# Patient Record
Sex: FEMALE | Race: OTHER | HISPANIC OR LATINO | URBAN - METROPOLITAN AREA
[De-identification: names, ages, dates, MRNs, and addresses within clinical notes are randomized per-mention and may not be internally consistent; named-entity substitution may affect disease eponyms.]

---

## 2023-08-17 ENCOUNTER — INPATIENT (INPATIENT)
Facility: HOSPITAL | Age: 83
LOS: 8 days | Discharge: HOME HEALTH | DRG: 305 | End: 2023-08-26
Attending: INTERNAL MEDICINE | Admitting: STUDENT IN AN ORGANIZED HEALTH CARE EDUCATION/TRAINING PROGRAM
Payer: MEDICARE

## 2023-08-17 PROCEDURE — 99285 EMERGENCY DEPT VISIT HI MDM: CPT

## 2023-08-18 ENCOUNTER — TRANSCRIPTION ENCOUNTER (OUTPATIENT)
Age: 83
End: 2023-08-18

## 2023-08-18 VITALS
DIASTOLIC BLOOD PRESSURE: 102 MMHG | HEART RATE: 88 BPM | RESPIRATION RATE: 18 BRPM | SYSTOLIC BLOOD PRESSURE: 231 MMHG | TEMPERATURE: 98 F | OXYGEN SATURATION: 98 %

## 2023-08-18 DIAGNOSIS — R42 DIZZINESS AND GIDDINESS: ICD-10-CM

## 2023-08-18 LAB
ALBUMIN SERPL ELPH-MCNC: 4.1 G/DL — SIGNIFICANT CHANGE UP (ref 3.5–5.2)
ALBUMIN SERPL ELPH-MCNC: 4.4 G/DL — SIGNIFICANT CHANGE UP (ref 3.5–5.2)
ALP SERPL-CCNC: 55 U/L — SIGNIFICANT CHANGE UP (ref 30–115)
ALP SERPL-CCNC: 57 U/L — SIGNIFICANT CHANGE UP (ref 30–115)
ALT FLD-CCNC: 18 U/L — SIGNIFICANT CHANGE UP (ref 0–41)
ALT FLD-CCNC: 20 U/L — SIGNIFICANT CHANGE UP (ref 0–41)
ANION GAP SERPL CALC-SCNC: 12 MMOL/L — SIGNIFICANT CHANGE UP (ref 7–14)
ANION GAP SERPL CALC-SCNC: 15 MMOL/L — HIGH (ref 7–14)
AST SERPL-CCNC: 21 U/L — SIGNIFICANT CHANGE UP (ref 0–41)
AST SERPL-CCNC: 32 U/L — SIGNIFICANT CHANGE UP (ref 0–41)
BASE EXCESS BLDV CALC-SCNC: 3.7 MMOL/L — HIGH (ref -2–3)
BASOPHILS # BLD AUTO: 0.06 K/UL — SIGNIFICANT CHANGE UP (ref 0–0.2)
BASOPHILS # BLD AUTO: 0.06 K/UL — SIGNIFICANT CHANGE UP (ref 0–0.2)
BASOPHILS NFR BLD AUTO: 0.6 % — SIGNIFICANT CHANGE UP (ref 0–1)
BASOPHILS NFR BLD AUTO: 0.7 % — SIGNIFICANT CHANGE UP (ref 0–1)
BILIRUB SERPL-MCNC: 0.4 MG/DL — SIGNIFICANT CHANGE UP (ref 0.2–1.2)
BILIRUB SERPL-MCNC: 0.5 MG/DL — SIGNIFICANT CHANGE UP (ref 0.2–1.2)
BUN SERPL-MCNC: 10 MG/DL — SIGNIFICANT CHANGE UP (ref 10–20)
BUN SERPL-MCNC: 7 MG/DL — LOW (ref 10–20)
CA-I SERPL-SCNC: 1.09 MMOL/L — LOW (ref 1.15–1.33)
CALCIUM SERPL-MCNC: 9 MG/DL — SIGNIFICANT CHANGE UP (ref 8.4–10.5)
CALCIUM SERPL-MCNC: 9.2 MG/DL — SIGNIFICANT CHANGE UP (ref 8.4–10.5)
CHLORIDE SERPL-SCNC: 103 MMOL/L — SIGNIFICANT CHANGE UP (ref 98–110)
CHLORIDE SERPL-SCNC: 105 MMOL/L — SIGNIFICANT CHANGE UP (ref 98–110)
CO2 SERPL-SCNC: 24 MMOL/L — SIGNIFICANT CHANGE UP (ref 17–32)
CO2 SERPL-SCNC: 25 MMOL/L — SIGNIFICANT CHANGE UP (ref 17–32)
CREAT SERPL-MCNC: 0.9 MG/DL — SIGNIFICANT CHANGE UP (ref 0.7–1.5)
CREAT SERPL-MCNC: 1.2 MG/DL — SIGNIFICANT CHANGE UP (ref 0.7–1.5)
EGFR: 45 ML/MIN/1.73M2 — LOW
EGFR: 64 ML/MIN/1.73M2 — SIGNIFICANT CHANGE UP
EOSINOPHIL # BLD AUTO: 0.05 K/UL — SIGNIFICANT CHANGE UP (ref 0–0.7)
EOSINOPHIL # BLD AUTO: 0.1 K/UL — SIGNIFICANT CHANGE UP (ref 0–0.7)
EOSINOPHIL NFR BLD AUTO: 0.6 % — SIGNIFICANT CHANGE UP (ref 0–8)
EOSINOPHIL NFR BLD AUTO: 1 % — SIGNIFICANT CHANGE UP (ref 0–8)
GAS PNL BLDV: 136 MMOL/L — SIGNIFICANT CHANGE UP (ref 136–145)
GAS PNL BLDV: SIGNIFICANT CHANGE UP
GAS PNL BLDV: SIGNIFICANT CHANGE UP
GLUCOSE SERPL-MCNC: 149 MG/DL — HIGH (ref 70–99)
GLUCOSE SERPL-MCNC: 154 MG/DL — HIGH (ref 70–99)
HCO3 BLDV-SCNC: 26 MMOL/L — SIGNIFICANT CHANGE UP (ref 22–29)
HCT VFR BLD CALC: 40.4 % — SIGNIFICANT CHANGE UP (ref 37–47)
HCT VFR BLD CALC: 40.4 % — SIGNIFICANT CHANGE UP (ref 37–47)
HCT VFR BLDA CALC: 39 % — SIGNIFICANT CHANGE UP (ref 39–51)
HGB BLD CALC-MCNC: 13.1 G/DL — SIGNIFICANT CHANGE UP (ref 12.6–17.4)
HGB BLD-MCNC: 13.5 G/DL — SIGNIFICANT CHANGE UP (ref 12–16)
HGB BLD-MCNC: 13.7 G/DL — SIGNIFICANT CHANGE UP (ref 12–16)
IMM GRANULOCYTES NFR BLD AUTO: 0.3 % — SIGNIFICANT CHANGE UP (ref 0.1–0.3)
IMM GRANULOCYTES NFR BLD AUTO: 0.3 % — SIGNIFICANT CHANGE UP (ref 0.1–0.3)
LACTATE BLDV-MCNC: 1.3 MMOL/L — SIGNIFICANT CHANGE UP (ref 0.5–2)
LYMPHOCYTES # BLD AUTO: 2.03 K/UL — SIGNIFICANT CHANGE UP (ref 1.2–3.4)
LYMPHOCYTES # BLD AUTO: 2.25 K/UL — SIGNIFICANT CHANGE UP (ref 1.2–3.4)
LYMPHOCYTES # BLD AUTO: 23.5 % — SIGNIFICANT CHANGE UP (ref 20.5–51.1)
LYMPHOCYTES # BLD AUTO: 23.5 % — SIGNIFICANT CHANGE UP (ref 20.5–51.1)
MAGNESIUM SERPL-MCNC: 2.1 MG/DL — SIGNIFICANT CHANGE UP (ref 1.8–2.4)
MAGNESIUM SERPL-MCNC: 2.2 MG/DL — SIGNIFICANT CHANGE UP (ref 1.8–2.4)
MCHC RBC-ENTMCNC: 31.3 PG — HIGH (ref 27–31)
MCHC RBC-ENTMCNC: 31.5 PG — HIGH (ref 27–31)
MCHC RBC-ENTMCNC: 33.4 G/DL — SIGNIFICANT CHANGE UP (ref 32–37)
MCHC RBC-ENTMCNC: 33.9 G/DL — SIGNIFICANT CHANGE UP (ref 32–37)
MCV RBC AUTO: 92.2 FL — SIGNIFICANT CHANGE UP (ref 81–99)
MCV RBC AUTO: 94.2 FL — SIGNIFICANT CHANGE UP (ref 81–99)
MONOCYTES # BLD AUTO: 0.61 K/UL — HIGH (ref 0.1–0.6)
MONOCYTES # BLD AUTO: 0.76 K/UL — HIGH (ref 0.1–0.6)
MONOCYTES NFR BLD AUTO: 7.1 % — SIGNIFICANT CHANGE UP (ref 1.7–9.3)
MONOCYTES NFR BLD AUTO: 7.9 % — SIGNIFICANT CHANGE UP (ref 1.7–9.3)
NEUTROPHILS # BLD AUTO: 5.87 K/UL — SIGNIFICANT CHANGE UP (ref 1.4–6.5)
NEUTROPHILS # BLD AUTO: 6.36 K/UL — SIGNIFICANT CHANGE UP (ref 1.4–6.5)
NEUTROPHILS NFR BLD AUTO: 66.7 % — SIGNIFICANT CHANGE UP (ref 42.2–75.2)
NEUTROPHILS NFR BLD AUTO: 67.8 % — SIGNIFICANT CHANGE UP (ref 42.2–75.2)
NRBC # BLD: 0 /100 WBCS — SIGNIFICANT CHANGE UP (ref 0–0)
NRBC # BLD: 0 /100 WBCS — SIGNIFICANT CHANGE UP (ref 0–0)
PCO2 BLDV: 32 MMHG — LOW (ref 39–42)
PH BLDV: 7.52 — HIGH (ref 7.32–7.43)
PLATELET # BLD AUTO: 309 K/UL — SIGNIFICANT CHANGE UP (ref 130–400)
PLATELET # BLD AUTO: 344 K/UL — SIGNIFICANT CHANGE UP (ref 130–400)
PMV BLD: 9.5 FL — SIGNIFICANT CHANGE UP (ref 7.4–10.4)
PMV BLD: 9.9 FL — SIGNIFICANT CHANGE UP (ref 7.4–10.4)
PO2 BLDV: 71 MMHG — SIGNIFICANT CHANGE UP
POTASSIUM BLDV-SCNC: 3.7 MMOL/L — SIGNIFICANT CHANGE UP (ref 3.5–5.1)
POTASSIUM SERPL-MCNC: 3.5 MMOL/L — SIGNIFICANT CHANGE UP (ref 3.5–5)
POTASSIUM SERPL-MCNC: 3.6 MMOL/L — SIGNIFICANT CHANGE UP (ref 3.5–5)
POTASSIUM SERPL-SCNC: 3.5 MMOL/L — SIGNIFICANT CHANGE UP (ref 3.5–5)
POTASSIUM SERPL-SCNC: 3.6 MMOL/L — SIGNIFICANT CHANGE UP (ref 3.5–5)
PROT SERPL-MCNC: 6.4 G/DL — SIGNIFICANT CHANGE UP (ref 6–8)
PROT SERPL-MCNC: 7 G/DL — SIGNIFICANT CHANGE UP (ref 6–8)
RBC # BLD: 4.29 M/UL — SIGNIFICANT CHANGE UP (ref 4.2–5.4)
RBC # BLD: 4.38 M/UL — SIGNIFICANT CHANGE UP (ref 4.2–5.4)
RBC # FLD: 12.9 % — SIGNIFICANT CHANGE UP (ref 11.5–14.5)
RBC # FLD: 13 % — SIGNIFICANT CHANGE UP (ref 11.5–14.5)
SAO2 % BLDV: 96.1 % — SIGNIFICANT CHANGE UP
SODIUM SERPL-SCNC: 139 MMOL/L — SIGNIFICANT CHANGE UP (ref 135–146)
SODIUM SERPL-SCNC: 145 MMOL/L — SIGNIFICANT CHANGE UP (ref 135–146)
TROPONIN T SERPL-MCNC: <0.01 NG/ML — SIGNIFICANT CHANGE UP
WBC # BLD: 8.65 K/UL — SIGNIFICANT CHANGE UP (ref 4.8–10.8)
WBC # BLD: 9.56 K/UL — SIGNIFICANT CHANGE UP (ref 4.8–10.8)
WBC # FLD AUTO: 8.65 K/UL — SIGNIFICANT CHANGE UP (ref 4.8–10.8)
WBC # FLD AUTO: 9.56 K/UL — SIGNIFICANT CHANGE UP (ref 4.8–10.8)

## 2023-08-18 PROCEDURE — 84443 ASSAY THYROID STIM HORMONE: CPT

## 2023-08-18 PROCEDURE — 71045 X-RAY EXAM CHEST 1 VIEW: CPT | Mod: 26

## 2023-08-18 PROCEDURE — 83835 ASSAY OF METANEPHRINES: CPT

## 2023-08-18 PROCEDURE — 93005 ELECTROCARDIOGRAM TRACING: CPT

## 2023-08-18 PROCEDURE — 93306 TTE W/DOPPLER COMPLETE: CPT

## 2023-08-18 PROCEDURE — 82962 GLUCOSE BLOOD TEST: CPT

## 2023-08-18 PROCEDURE — 70551 MRI BRAIN STEM W/O DYE: CPT

## 2023-08-18 PROCEDURE — 92610 EVALUATE SWALLOWING FUNCTION: CPT | Mod: GN

## 2023-08-18 PROCEDURE — 70496 CT ANGIOGRAPHY HEAD: CPT | Mod: 26,MA

## 2023-08-18 PROCEDURE — 76705 ECHO EXAM OF ABDOMEN: CPT

## 2023-08-18 PROCEDURE — 93306 TTE W/DOPPLER COMPLETE: CPT | Mod: 26

## 2023-08-18 PROCEDURE — 82088 ASSAY OF ALDOSTERONE: CPT

## 2023-08-18 PROCEDURE — 36415 COLL VENOUS BLD VENIPUNCTURE: CPT

## 2023-08-18 PROCEDURE — 84244 ASSAY OF RENIN: CPT

## 2023-08-18 PROCEDURE — 97162 PT EVAL MOD COMPLEX 30 MIN: CPT | Mod: GP

## 2023-08-18 PROCEDURE — 85027 COMPLETE CBC AUTOMATED: CPT

## 2023-08-18 PROCEDURE — 83735 ASSAY OF MAGNESIUM: CPT

## 2023-08-18 PROCEDURE — 76770 US EXAM ABDO BACK WALL COMP: CPT

## 2023-08-18 PROCEDURE — 80061 LIPID PANEL: CPT

## 2023-08-18 PROCEDURE — 80053 COMPREHEN METABOLIC PANEL: CPT

## 2023-08-18 PROCEDURE — 85025 COMPLETE CBC W/AUTO DIFF WBC: CPT

## 2023-08-18 PROCEDURE — 70450 CT HEAD/BRAIN W/O DYE: CPT | Mod: 26,59,MA

## 2023-08-18 PROCEDURE — 99223 1ST HOSP IP/OBS HIGH 75: CPT

## 2023-08-18 PROCEDURE — 80048 BASIC METABOLIC PNL TOTAL CA: CPT

## 2023-08-18 PROCEDURE — 83036 HEMOGLOBIN GLYCOSYLATED A1C: CPT

## 2023-08-18 PROCEDURE — 93010 ELECTROCARDIOGRAM REPORT: CPT | Mod: 76

## 2023-08-18 PROCEDURE — 84439 ASSAY OF FREE THYROXINE: CPT

## 2023-08-18 PROCEDURE — 70498 CT ANGIOGRAPHY NECK: CPT | Mod: 26,MA

## 2023-08-18 PROCEDURE — C9113: CPT

## 2023-08-18 RX ORDER — LABETALOL HCL 100 MG
10 TABLET ORAL ONCE
Refills: 0 | Status: COMPLETED | OUTPATIENT
Start: 2023-08-18 | End: 2023-08-18

## 2023-08-18 RX ORDER — HYDRALAZINE HCL 50 MG
10 TABLET ORAL ONCE
Refills: 0 | Status: COMPLETED | OUTPATIENT
Start: 2023-08-18 | End: 2023-08-18

## 2023-08-18 RX ORDER — METOCLOPRAMIDE HCL 10 MG
10 TABLET ORAL ONCE
Refills: 0 | Status: COMPLETED | OUTPATIENT
Start: 2023-08-18 | End: 2023-08-18

## 2023-08-18 RX ORDER — POLYETHYLENE GLYCOL 3350 17 G/17G
17 POWDER, FOR SOLUTION ORAL DAILY
Refills: 0 | Status: DISCONTINUED | OUTPATIENT
Start: 2023-08-18 | End: 2023-08-18

## 2023-08-18 RX ORDER — NIFEDIPINE 30 MG
1 TABLET, EXTENDED RELEASE 24 HR ORAL
Qty: 30 | Refills: 0
Start: 2023-08-18 | End: 2023-09-16

## 2023-08-18 RX ORDER — POLYETHYLENE GLYCOL 3350 17 G/17G
17 POWDER, FOR SOLUTION ORAL
Qty: 1 | Refills: 0
Start: 2023-08-18

## 2023-08-18 RX ORDER — ONDANSETRON 8 MG/1
4 TABLET, FILM COATED ORAL ONCE
Refills: 0 | Status: COMPLETED | OUTPATIENT
Start: 2023-08-18 | End: 2023-08-18

## 2023-08-18 RX ORDER — HEPARIN SODIUM 5000 [USP'U]/ML
5000 INJECTION INTRAVENOUS; SUBCUTANEOUS EVERY 12 HOURS
Refills: 0 | Status: DISCONTINUED | OUTPATIENT
Start: 2023-08-18 | End: 2023-08-21

## 2023-08-18 RX ORDER — SENNA PLUS 8.6 MG/1
2 TABLET ORAL
Refills: 0 | Status: DISCONTINUED | OUTPATIENT
Start: 2023-08-18 | End: 2023-08-18

## 2023-08-18 RX ORDER — SENNA PLUS 8.6 MG/1
2 TABLET ORAL
Qty: 120 | Refills: 0
Start: 2023-08-18 | End: 2023-09-16

## 2023-08-18 RX ORDER — NIFEDIPINE 30 MG
60 TABLET, EXTENDED RELEASE 24 HR ORAL ONCE
Refills: 0 | Status: COMPLETED | OUTPATIENT
Start: 2023-08-18 | End: 2023-08-18

## 2023-08-18 RX ORDER — SODIUM CHLORIDE 9 MG/ML
1000 INJECTION INTRAMUSCULAR; INTRAVENOUS; SUBCUTANEOUS ONCE
Refills: 0 | Status: COMPLETED | OUTPATIENT
Start: 2023-08-18 | End: 2023-08-18

## 2023-08-18 RX ORDER — SODIUM CHLORIDE 9 MG/ML
1000 INJECTION, SOLUTION INTRAVENOUS
Refills: 0 | Status: DISCONTINUED | OUTPATIENT
Start: 2023-08-18 | End: 2023-08-19

## 2023-08-18 RX ORDER — NIFEDIPINE 30 MG
60 TABLET, EXTENDED RELEASE 24 HR ORAL AT BEDTIME
Refills: 0 | Status: DISCONTINUED | OUTPATIENT
Start: 2023-08-18 | End: 2023-08-21

## 2023-08-18 RX ORDER — LANOLIN ALCOHOL/MO/W.PET/CERES
5 CREAM (GRAM) TOPICAL ONCE
Refills: 0 | Status: COMPLETED | OUTPATIENT
Start: 2023-08-18 | End: 2023-08-18

## 2023-08-18 RX ADMIN — Medication 5 MILLIGRAM(S): at 04:43

## 2023-08-18 RX ADMIN — SENNA PLUS 2 TABLET(S): 8.6 TABLET ORAL at 06:25

## 2023-08-18 RX ADMIN — Medication 10 MILLIGRAM(S): at 21:38

## 2023-08-18 RX ADMIN — Medication 10 MILLIGRAM(S): at 00:50

## 2023-08-18 RX ADMIN — Medication 10 MILLIGRAM(S): at 20:19

## 2023-08-18 RX ADMIN — SODIUM CHLORIDE 50 MILLILITER(S): 9 INJECTION, SOLUTION INTRAVENOUS at 21:39

## 2023-08-18 RX ADMIN — HEPARIN SODIUM 5000 UNIT(S): 5000 INJECTION INTRAVENOUS; SUBCUTANEOUS at 17:35

## 2023-08-18 RX ADMIN — SODIUM CHLORIDE 50 MILLILITER(S): 9 INJECTION, SOLUTION INTRAVENOUS at 06:28

## 2023-08-18 RX ADMIN — POLYETHYLENE GLYCOL 3350 17 GRAM(S): 17 POWDER, FOR SOLUTION ORAL at 06:25

## 2023-08-18 RX ADMIN — Medication 60 MILLIGRAM(S): at 21:15

## 2023-08-18 RX ADMIN — Medication 60 MILLIGRAM(S): at 04:43

## 2023-08-18 RX ADMIN — SODIUM CHLORIDE 1000 MILLILITER(S): 9 INJECTION INTRAMUSCULAR; INTRAVENOUS; SUBCUTANEOUS at 00:47

## 2023-08-18 RX ADMIN — ONDANSETRON 4 MILLIGRAM(S): 8 TABLET, FILM COATED ORAL at 00:47

## 2023-08-18 RX ADMIN — HEPARIN SODIUM 5000 UNIT(S): 5000 INJECTION INTRAVENOUS; SUBCUTANEOUS at 06:26

## 2023-08-18 NOTE — DISCHARGE NOTE PROVIDER - NSDCFUADDAPPT_GEN_ALL_CORE_FT
Please follow up with your PCP and dentist after discharge in 1-2weeks Please follow up with your PCP and dentist after discharge in 1-2 weeks.  You have an appointment with your PCP Dr. Quijano on 9/7/23 at 2:15PM at the [facility] clinic.

## 2023-08-18 NOTE — DISCHARGE NOTE PROVIDER - DISCHARGE DIET
DASH Diet/Low Sodium Diet/Soft and Bite-Sized Diet DASH Diet/Low Sodium Diet/Soft and Bite-Sized Diet/Consistent Carbohydrate Diabetic Diets

## 2023-08-18 NOTE — DISCHARGE NOTE PROVIDER - HOSPITAL COURSE
82 year old female with PMH HTN who presents with lightheadedness and decreased po intake for the past 3 weeks.  Patient states that on 7/26 she went to the dentist and she got dentures and the dental office used a paste on the dentures which made her nauseas and now every time she eats she tastes the bad taste of the dentures and cannot eat.  Sometimes she is able to quickly swallow broth but not chew foods.  She states that not being able to eat properly has led to her being lightheaded all the time.  She oftentimes feels like she will pass out but has never passed out.  Patient has hypertension which she self treats with garlic. She also feels constipated from lack of eating real food over the past few weeks.    In ED vitals: Hypertensive urgency with /102 s/p IV labetalol 10mg and Nifedipine 60mg>>BP improved to 170s/90s  Labs: unremarkable  CTH was negative. Admitted to Tele for further management and monitoring.    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  Hypertensive urgency with /102 s/p IV labetalol 10mg and Nifedipine 60mg>>BP improved to 170s/90s  Started on Nifedipine XL 60mg daily and Monitor BP closely  DASH and low sodium- soft and bite sized diet  No events on Telemetry, EKG NSR  ECHO showed normal with EF 59%  Orthostatic vitals- negative  Will need close outpatient f/u for HTN    #Decreased PO intake due to nausea due to denture paste   IV hydration and encourage oral intake  Pt advised to f/u with her own dentist for re-evaluation    #Incidental bilateral Thyroid nodules - outpt thyroid US and TSH level     82 year old female with PMH HTN who presented with lightheadedness and decreased po intake for the past 3 weeks.  Patient stated that on 7/26 she went to the dentist and she got dentures and the dental office used a paste on the dentures which made her nauseas and now every time she eats she tastes the bad taste of the dentures and cannot eat.  Sometimes she was able to quickly swallow broth but not chew foods.  She stated that not being able to eat properly has led to her being lightheaded all the time.  She oftentimes feels like she will pass out but has never passed out.  Patient has hypertension which she self treated with garlic. She also felt constipated from lack of eating real food over the past few weeks.    In ED vitals: Hypertensive urgency with /102 s/p IV labetalol 10mg and Nifedipine 60mg>>BP improved to 170s/90s  Labs: unremarkable  CTH was negative. Admitted to Tele for further management and monitoring.    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  - Hypertensive urgency with /102 s/p IV labetalol 10mg and Nifedipine 60mg>>BP improved to 170s/190s  - started on losartan 50mg PO daily on 8/19 -> BP improved to 150s/190s  - spironolactone 25mg was started on 8/21 and d/c on 8/22  - Nifedipine XL 60mg daily was increased to 90mg daily at bedtime on 8/21 -> BP improved to 130s/140s  - DASH and low sodium- soft and bite sized diet  - No events on Telemetry, EKG NSR - tele d/c on 8/22  - ECHO showed normal with EF 59%  - Orthostatic vitals- negative  - Will need close outpatient f/u for HTN    #Decreased PO intake due to nausea due to denture paste  - was on Zofran PRN for nausea  - Pt advised to f/u with her own dentist for re-evaluation  - pt received Miralax and senna daily for constipation but then had diarrhea -> Miralax and senna were then given PRN    #Incidental bilateral Thyroid nodules  - CTA of head and neck showed bilateral thyroid nodules measuring up to 1.4cm in R thyroid lobe  - TSH, fT4 lvls within normal range  - recommend outpt thyroid U/S     82 year old female with PMH HTN who presented with lightheadedness and decreased po intake for the past 3 weeks.  Patient stated that on 7/26 she went to the dentist and she got dentures and the dental office used a paste on the dentures which made her nauseas and now every time she eats she tastes the bad taste of the dentures and cannot eat.  Sometimes she was able to quickly swallow broth but not chew foods.  She stated that not being able to eat properly has led to her being lightheaded all the time.  She oftentimes feels like she will pass out but has never passed out.  Patient has hypertension which she self treated with garlic. She also felt constipated from lack of eating real food over the past few weeks.    In ED vitals: Hypertensive urgency with /102 s/p IV labetalol 10mg and Nifedipine 60mg>>BP improved to 170s/90s  Labs: unremarkable  CTH was negative. Admitted to Tele for further management and monitoring.    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  - MRI head (8/21): no acute pathology  - Hypertensive urgency with /102 s/p IV labetalol 10mg and Nifedipine 60mg>>BP improved to 170s/190s  - started on losartan 50mg PO daily on 8/19 -> BP improved to 150s/190s  - spironolactone 25mg was started on 8/21 and d/c on 8/22  - Nifedipine XL 60mg daily was increased to 90mg daily at bedtime on 8/21 -> BP improved to 130s/140s  - No events on Telemetry, EKG NSR - tele d/c on 8/22  - ECHO (8/18) normal with EF 59%  - Orthostatic vitals- negative  - DASH and low sodium- soft and bite-sized diet  - BP controlled but Cr uptrending to 1.5 (8/24) -> d/c losartan (8/24)  - Cr downtrended  - nifedipine 90mg QD at bedtime was cont.  - d/c metoprolol succinate 25mg QD (8/25)  - started HCTZ 12.5mg QD in AM (8/26)  - needs close outpatient f/u for HTN -> has [facility] appt on 9/7 w/ Dr. Quijano    #Decreased PO intake due to nausea due to denture paste  - Zofran PRN for nausea was cont.  - Pt advised to f/u with her own dentist for re-evaluation  - pt received Miralax and senna daily for constipation but then had diarrhea -> Miralax and senna given PRN  - heartburn sensation - Protonix 40mg PO BID was cont.    #Incidental bilateral Thyroid nodules  - CTA of head and neck (8/18) showed bilateral thyroid nodules measuring up to 1.4cm in R thyroid lobe  - TSH, fT4 lvls within normal range  - recommend outpt thyroid U/S    #Elevated LFTs  - ,  (8/24) - downtrended to ,  (8/25)  - RUQ U/S (8/24): diffuse hepatic steatosis, nonmobile gallbladder wall echogenic foci measuring up to 1cm, likely polyps -> f/u exam in 6 months recommended  - f/u outpt     82 year old female with PMH HTN who presented with lightheadedness and decreased po intake for the past 3 weeks.  Patient stated that on 7/26 she went to the dentist and she got dentures and the dental office used a paste on the dentures which made her nauseas and now every time she eats she tastes the bad taste of the dentures and cannot eat.  Sometimes she was able to quickly swallow broth but not chew foods.  She stated that not being able to eat properly has led to her being lightheaded all the time.  She oftentimes feels like she will pass out but has never passed out.  Patient has hypertension which she self treated with garlic. She also felt constipated from lack of eating real food over the past few weeks.    In ED vitals: Hypertensive urgency with /102 s/p IV labetalol 10mg and Nifedipine 60mg>>BP improved to 170s/90s  Labs: unremarkable  CTH was negative. Admitted to Tele for further management and monitoring.    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  - MRI head (8/21): no acute pathology  - Hypertensive urgency with /102 s/p IV labetalol 10mg and Nifedipine 60mg>>BP improved to 170s/190s  - started on losartan 50mg PO daily on 8/19 -> BP improved to 150s/190s  - spironolactone 25mg was started on 8/21 and d/c on 8/22  - Nifedipine XL 60mg daily was increased to 90mg daily at bedtime on 8/21 -> BP improved to 130s/140s  - No events on Telemetry, EKG NSR - tele d/c on 8/22  - ECHO (8/18) normal with EF 59%  - Orthostatic vitals- negative  - DASH and low sodium- soft and bite-sized diet  - BP controlled but Cr uptrending to 1.5 (8/24) -> d/c losartan (8/24)  - Cr downtrended  - nifedipine 90mg QD at bedtime was cont.  - d/c metoprolol succinate 25mg QD (8/25)  - started HCTZ 12.5mg QD in AM (8/26)  - needs close outpatient f/u for HTN -> has [facility] appt on 9/7 w/ Dr. Quijano    #Decreased PO intake due to nausea due to denture paste  - Zofran PRN for nausea was cont.  - Pt advised to f/u with her own dentist for re-evaluation  - pt received Miralax and senna daily for constipation but then had diarrhea -> Miralax and senna given PRN  - heartburn sensation - Protonix 40mg PO BID was cont.    #Incidental bilateral Thyroid nodules  - CTA of head and neck (8/18) showed bilateral thyroid nodules measuring up to 1.4cm in R thyroid lobe  - TSH, fT4 lvls within normal range  - recommend outpt thyroid U/S    #Elevated LFTs  - ,  (8/24) - downtrended to ,  (8/25)  - RUQ U/S (8/24): diffuse hepatic steatosis, nonmobile gallbladder wall echogenic foci measuring up to 1cm, likely polyps -> f/u exam in 6 months recommended  - f/u outpt    Attending Attestation:  Patient was seen & examined independently. At least 10 systems were reviewed in ROS. All systems reviewed  are within normal limits. Latest vital signs and labs were reviewed today. Case was discussed with house staff in morning rounds for assessment and plan.  Patient is medically stable for discharge . About 32 mins spent on discharge disposition.

## 2023-08-18 NOTE — DISCHARGE NOTE PROVIDER - NSDCCPCAREPLAN_GEN_ALL_CORE_FT
PRINCIPAL DISCHARGE DIAGNOSIS  Diagnosis: Lightheadedness  Assessment and Plan of Treatment: You presented with lightheadedness and found to have uncontrolled hypertension on presentation. You symptoms are likely due to uncontrolled blood pressure and poor oral inatke. Most of the work up came back negative for any brain or heart issues. We started you on a blood pressure medication. Monitor your BP closely. Please follow up with all your doctor appointment as instructed and take medication as prescribed.  You are advised to follow up with your dentist outpatient for adjustment of the dnetures and eat a soft low salt DASH diet.        SECONDARY DISCHARGE DIAGNOSES  Diagnosis: Uncontrolled hypertension  Assessment and Plan of Treatment: Hypertension  Hypertension, commonly called high blood pressure, is when the force of blood pumping through your arteries is too strong. Hypertension forces your heart to work harder to pump blood. Your arteries may become narrow or stiff. Having untreated or uncontrolled hypertension for a long period of time can cause heart attack, stroke, kidney disease, and other problems. If started on a medication, take exactly as prescribed by your health care professional. Maintain a healthy lifestyle and follow up with your primary care physician.  SEEK IMMEDIATE MEDICAL CARE IF YOU HAVE ANY OF THE FOLLOWING SYMPTOMS: severe headache, confusion, chest pain, abdominal pain, vomiting, or shortness of breath.      Diagnosis: Thyroid nodule incidentally noted on imaging study  Assessment and Plan of Treatment: You were found to have thyroid nocules on CT scan. You are advised to follow up with your PCP for thyroid ultrasound and TSH levels.       PRINCIPAL DISCHARGE DIAGNOSIS  Diagnosis: Lightheadedness  Assessment and Plan of Treatment: You presented with lightheadedness and was found to have uncontrolled hypertension on presentation. Your symptoms are likely due to uncontrolled high blood pressure and poor oral intake. Most of the work up came back negative for any brain or heart issues. We started you on blood pressure medications in the hospital that you should continue taking at home. Please monitor your blood pressure closely. Continue to take your medications as prescribed and follow up with your primary care physician. Your next appointment is with Dr. Quijano on 9/7/23 at 2:15PM at the [facility] clinic. You are advised to follow a soft and bite-sized, low salt DASH diet.  You are also advised to follow up with your dentist outpatient for adjustment of the dentures to help increase your oral intake.        SECONDARY DISCHARGE DIAGNOSES  Diagnosis: Uncontrolled hypertension  Assessment and Plan of Treatment: Hypertension  Hypertension, commonly called high blood pressure, is when the force of blood pumping through your arteries is too strong. Hypertension forces your heart to work harder to pump blood. Your arteries may become narrow or stiff. Having untreated or uncontrolled hypertension for a long period of time can cause heart attack, stroke, kidney disease, and other problems. If started on a medication, take exactly as prescribed by your health care professional. Maintain a healthy lifestyle and follow up with your primary care physician.  SEEK IMMEDIATE MEDICAL CARE IF YOU HAVE ANY OF THE FOLLOWING SYMPTOMS: severe headache, confusion, chest pain, abdominal pain, vomiting, or shortness of breath.      Diagnosis: Thyroid nodule incidentally noted on imaging study  Assessment and Plan of Treatment: You were found to have thyroid nodules on the CT scan. You are advised to follow up with your primary care physician for a thyroid ultrasound and TSH levels.       PRINCIPAL DISCHARGE DIAGNOSIS  Diagnosis: Lightheadedness  Assessment and Plan of Treatment: You presented with lightheadedness and was found to have uncontrolled hypertension on presentation. Your symptoms are likely due to uncontrolled high blood pressure and poor oral intake. Most of the work up came back negative for any brain or heart issues. We started you on blood pressure medications in the hospital that you should continue taking at home. Please monitor your blood pressure closely. Continue to take your medications as prescribed and follow up with your primary care physician. Your next appointment is with Dr. Quijano on 9/7/23 at 2:15PM at the [facility] clinic. You are advised to follow a soft and bite-sized, consistent carbohydrate, low salt DASH diet.  You are also advised to follow up with your dentist outpatient for adjustment of the dentures to help increase your oral intake.        SECONDARY DISCHARGE DIAGNOSES  Diagnosis: Uncontrolled hypertension  Assessment and Plan of Treatment: Hypertension  Hypertension, commonly called high blood pressure, is when the force of blood pumping through your arteries is too strong. Hypertension forces your heart to work harder to pump blood. Your arteries may become narrow or stiff. Having untreated or uncontrolled hypertension for a long period of time can cause heart attack, stroke, kidney disease, and other problems. If started on a medication, take exactly as prescribed by your health care professional. Maintain a healthy lifestyle and follow up with your primary care physician.  SEEK IMMEDIATE MEDICAL CARE IF YOU HAVE ANY OF THE FOLLOWING SYMPTOMS: severe headache, confusion, chest pain, abdominal pain, vomiting, or shortness of breath.      Diagnosis: Thyroid nodule incidentally noted on imaging study  Assessment and Plan of Treatment: You were found to have thyroid nodules on the CT scan. You are advised to follow up with your primary care physician for a thyroid ultrasound and TSH levels.      Diagnosis: Gallbladder polyp  Assessment and Plan of Treatment: You were found to have polyps of the gallbladder on ultrasound. A polyp is a non-cancerous growth. You are advised to follow up with your primary care physician for a follow-up examination in 6 months.

## 2023-08-18 NOTE — ED PROVIDER NOTE - CLINICAL SUMMARY MEDICAL DECISION MAKING FREE TEXT BOX
Patient presents with dizziness. labs, ekg, cxr, ct done no acute findings. BP improved in the ED. Patient admitted for further evaluation.

## 2023-08-18 NOTE — SWALLOW BEDSIDE ASSESSMENT ADULT - SLP GENERAL OBSERVATIONS
Pt found laying in bed a&ox4. Pt presents w/ no upper teeth. Pt said she recently got dentures but they made her nauseous. Pt states its hard to chew now that she has no upper teeth.

## 2023-08-18 NOTE — H&P ADULT - ATTENDING COMMENTS
Pt seen and examined today in ED4. History confirmed with the pt and as above. She was using garlic to treat her hypertension. She received dentures recently but had nausea and decreased intake due to bad taste. She felt lightheaded but did not have LOC. No headache, vomiting, abdominal pain, SOB, chest pain. She drank some liquids today. BP in the ED was 231/102 and she was given labetalol 10mg IV x1   She passed for soft diet now. ROS negative    T(F): 98.9 (08-18-23 @ 08:30), Max: 98.9 (08-18-23 @ 08:30)  HR: 86 (08-18-23 @ 08:30) (68 - 88)  BP: 178/84 (08-18-23 @ 08:30) (166/74 - 231/102)  RR: 18 (08-18-23 @ 08:30) (18 - 18)  SpO2: 96% (08-18-23 @ 08:30) (96% - 99%) on RA    POCT Blood Glucose.: 152 mg/dL (18 Aug 2023 00:22)    PHYSICAL EXAM:  GENERAL: NAD  HEAD:  Normocephalic  EYES:  conjunctiva and sclera clear  ENMT: Moist mucous membranes  NECK: Supple, No JVD  NERVOUS SYSTEM:  Alert, awake, Good concentration, motor 5/5 b/l, no nystagmus, no ataxia on FTN, EOMI  CHEST/LUNG: CTA b/l; No rales, rhonchi, wheezing  HEART: Regular rate and rhythm; No murmurs  ABDOMEN: Soft, Nontender, Nondistended; Bowel sounds present  EXTREMITIES: No edema  SKIN: warm, dry    Consultant(s) Notes Reviewed:  [x ] YES  [ ] NO  Care Discussed with Consultants/Other Providers [ x] YES  [ ] NO    Telemetry reviewed by me  EKG reviewed by me    LABS:                        13.5   8.65  )-----------( 309      ( 18 Aug 2023 11:12 )             40.4     08-18    139  |  103  |  10  ----------------------------<  149<H>  3.6   |  24  |  1.2    Ca    9.2      18 Aug 2023 00:23  Mg     2.2     08-18  TPro  7.0  /  Alb  4.4  /  TBili  0.5  /  DBili  x   /  AST  32  /  ALT  20  /  AlkPhos  55  08-18  CARDIAC MARKERS ( 18 Aug 2023 00:23 )  x     / <0.01 ng/mL / x     / x     / x        RADIOLOGY & ADDITIONAL TESTS:  Imaging or report Personally Reviewed:  [x ] YES  [ ] NO  Case discussed with residents and pt on rounds today    81 y/o woman with PMH of HTN on garlic presented with lightheadedness and decreased po intake for the past 3 weeks.      1. 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 Decreased PO intake - once tolerating diet, d/c IVF  nausea due to denture paste - pt advised to f/u with her own dentist for re-evaluation    3. Thyroid nodules - outpt thyroid US and TSH level    4. DVT prophylaxis - OOB and ambulate - on heparin SQ    full code  guarded prognosis    possible discharge to home in 24hrs if BP is better controlled, pt is tolerating the diet and after review of ECHO and orthostatics

## 2023-08-18 NOTE — DISCHARGE NOTE PROVIDER - CARE PROVIDERS DIRECT ADDRESSES
,DirectAddress_Unknown ,[email or web address]

## 2023-08-18 NOTE — DISCHARGE NOTE PROVIDER - NSDCMRMEDTOKEN_GEN_ALL_CORE_FT
hydroCHLOROthiazide 12.5 mg oral capsule: 1 cap(s) orally once a day  hydroCHLOROthiazide 12.5 mg oral capsule: 1 cap(s) orally once a day  NIFEdipine 90 mg oral tablet, extended release: 1 tab(s) orally once a day (at bedtime)  pantoprazole 40 mg oral delayed release tablet: 1 tab(s) orally once a day  Procardia XL 90 mg oral tablet, extended release: 1 tab(s) orally once a day  Protonix 40 mg oral delayed release tablet: 1 tab(s) orally once a day  Senna 8.6 mg oral tablet: 2 tab(s) orally once a day (at bedtime)

## 2023-08-18 NOTE — H&P ADULT - ASSESSMENT
Patient is an 82 year old female with PMH HTN who presents with lightheadedness and decreased po intake for the past 3 weeks.      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 Otherwise no significant stenosis of the vessels of the head and neck. Bilateral thyroid nodules measuring up to 1.4 cm in the right thyroid lobe. May consider nonemergent outpatient thyroid ultrasound for further evaluation.  -Ct head non con: No evidence for acute intracranial pathology.  -Patient states that on 7/26 she went to the dentist and she got dentures and the dental office used a paste on the dentures which made her nauseas and now every time she eats she tastes the bad taste of the dentures and cannot eat.    -she states the bad taste and lack of eating causes her to be lightheaded and has almost passed out numberous times  -dental consult for possible alterations in dentures  -obtain echo, orthostatics  -S&S  -start IV fluids    #HTN  -/102  -patient uses garlic as treatment   -start nifedipine    #Constipation  -start senna/miralax    #Bilateral thyroid nodules measuring up to 1.4 cm in the right thyroid lobe  - May consider nonemergent outpatient thyroid ultrasound for further evaluation.    DVT proph: heparin subq

## 2023-08-18 NOTE — ED PROVIDER NOTE - OBJECTIVE STATEMENT
Pt is an 81 y/o female with PMH of HTN (not on meds) presenting for generalized weakness x 3 weeks. Pt is an 81 y/o female with PMH of HTN (not on meds) presenting for generalized weakness x 3 weeks. Reports lightheadedness and nausea. Pt is an 83 y/o female with PMH of HTN (not on meds) presenting for generalized weakness x 3 weeks. Reports lightheadedness and nausea. No headache, abdominal pain or diarrhea. Was seen at [facility] a few days ago and discharged.

## 2023-08-18 NOTE — ED ADULT TRIAGE NOTE - CHIEF COMPLAINT QUOTE
PT presents to the Ed c/o of nausea, dizziness and weakness x3 weeks. PT presents to the Ed c/o of nausea, dizziness and weakness x3 weeks. PT very lethargic in triage minimally responsive. Pt falling out of wheelchair pt brought to crit.

## 2023-08-18 NOTE — ED ADULT NURSE NOTE - NSFALLUNIVINTERV_ED_ALL_ED
Bed/Stretcher in lowest position, wheels locked, appropriate side rails in place/Call bell, personal items and telephone in reach/Instruct patient to call for assistance before getting out of bed/chair/stretcher/Non-slip footwear applied when patient is off stretcher/[place] to call system/Physically safe environment - no spills, clutter or unnecessary equipment/Purposeful proactive rounding/Room/bathroom lighting operational, light cord in reach

## 2023-08-18 NOTE — SWALLOW BEDSIDE ASSESSMENT ADULT - SWALLOW EVAL: DIAGNOSIS
Pt presents w/ no overt clinical s/s of aspiration/penetration while consuming soft & bite sized & thin liquids.

## 2023-08-18 NOTE — ED ADULT NURSE NOTE - CHIEF COMPLAINT QUOTE
PT presents to the Ed c/o of nausea, dizziness and weakness x3 weeks. PT very lethargic in triage minimally responsive. Pt falling out of wheelchair pt brought to crit.

## 2023-08-18 NOTE — ED PROVIDER NOTE - PHYSICAL EXAMINATION
CONST: well appearing for age  HEAD:  normocephalic, atraumatic  EYES:  conjunctivae without injection, drainage or discharge  ENMT:  nasal mucosa moist; mouth moist without ulcerations or lesions; throat moist without erythema, exudate, ulcerations or lesions  NECK:  supple  CARDIAC:  regular rate and rhythm, normal S1 and S2, no murmurs, rubs or gallops  RESP:  respiratory rate and effort appear normal for age; lungs are clear to auscultation bilaterally; no rales or wheezes  ABDOMEN:  soft, nontender, nondistended  MUSCULOSKELETAL/NEURO: AAOx3, sensation intact throughout, 5/5 strength in all extremities, normal movement, normal tone  SKIN:  normal skin color for age and race, well-perfused; warm and dry

## 2023-08-18 NOTE — DISCHARGE NOTE PROVIDER - NSDCFUSCHEDAPPT_GEN_ALL_CORE_FT
Shweta Quijano  [facility] PreAdmits  Scheduled Appointment: 09/07/2023    [facility]  GERIATRICS [address]  Scheduled Appointment: 09/07/2023

## 2023-08-18 NOTE — DISCHARGE NOTE PROVIDER - PROVIDER TOKENS
PROVIDER:[TOKEN:[[id number]],FOLLOWUP:[2 weeks]] PROVIDER:[TOKEN:[[id number]],FOLLOWUP:[2 weeks],ESTABLISHEDPATIENT:[T]]

## 2023-08-18 NOTE — H&P ADULT - NSHPLABSRESULTS_GEN_ALL_CORE
13.7   9.56  )-----------( 344      ( 18 Aug 2023 00:23 )             40.4       08-18    139  |  103  |  10  ----------------------------<  149<H>  3.6   |  24  |  1.2    Ca    9.2      18 Aug 2023 00:23  Mg     2.2     08-18    TPro  7.0  /  Alb  4.4  /  TBili  0.5  /  DBili  x   /  AST  32  /  ALT  20  /  AlkPhos  55  08-18              Urinalysis Basic - ( 18 Aug 2023 00:23 )    Color: x / Appearance: x / SG: x / pH: x  Gluc: 149 mg/dL / Ketone: x  / Bili: x / Urobili: x   Blood: x / Protein: x / Nitrite: x   Leuk Esterase: x / RBC: x / WBC x   Sq Epi: x / Non Sq Epi: x / Bacteria: x            Lactate Trend      CARDIAC MARKERS ( 18 Aug 2023 00:23 )  x     / <0.01 ng/mL / x     / x     / x            CAPILLARY BLOOD GLUCOSE      POCT Blood Glucose.: 152 mg/dL (18 Aug 2023 00:22)

## 2023-08-18 NOTE — ED PROVIDER NOTE - ATTENDING CONTRIBUTION TO CARE
82-year-old female past medical history of hypertension presents with dizziness.  Patient states that for the last week she has been having lightheaded dizziness.  States that she went to [facility] 2 days ago and had a work-up there.  Patient unclear what tests were done.  States that she left prior to getting her CT scan done.  Since has been at home with her son.  Son states that tonight her symptoms worsened so brought her back in for evaluation.  Patient denies any chest pain shortness of breath or palpitations.  No headaches.  Positive lightheaded sensation.  No numbness tingling or weakness.  No fevers or recent illness.    CONSTITUTIONAL: Well-developed; well-nourished; in no acute distress.   SKIN: warm, dry  HEAD: Normocephalic; atraumatic.  EYES: PERRL, EOMI, no conjunctival erythema  ENT: No nasal discharge; airway clear.  NECK: Supple; non tender.  CARD: S1, S2 normal;  Regular rate and rhythm.   RESP: No wheezes, rales or rhonchi.  ABD: soft non tender, non distended, no rebound or guarding  EXT: Normal ROM.  5/5 strength in all 4 extremities   LYMPH: No acute cervical adenopathy.  NEURO: Alert, oriented, grossly unremarkable. neurovascularly intact  PSYCH: Cooperative, appropriate.

## 2023-08-18 NOTE — H&P ADULT - NSHPPHYSICALEXAM_GEN_ALL_CORE
PHYSICAL EXAM:  GENERAL: NAD, lying comfortably in bed  HEAD:  Atraumatic, Normocephalic  EYES: EOMI, PERRLA, conjunctiva and sclera clear  ENMT: No tonsillar erythema, exudates, or enlargement; Moist mucous membranes  NECK: Supple, No JVD, Normal thyroid  HEART: Regular rate and rhythm; No murmurs, rubs, or gallops  RESPIRATORY: CTA B/L, No W/R/R  ABDOMEN: Soft, Nontender, Nondistended; Bowel sounds present  NEUROLOGY: A&Ox3, nonfocal, moving all extremities  EXTREMITIES:  2+ Peripheral Pulses, No clubbing, cyanosis, or edema

## 2023-08-18 NOTE — PATIENT PROFILE ADULT - FALL HARM RISK - UNIVERSAL INTERVENTIONS
Bed in lowest position, wheels locked, appropriate side rails in place/Call bell, personal items and telephone in reach/Instruct patient to call for assistance before getting out of bed or chair/Non-slip footwear when patient is out of bed/[place] to call system/Physically safe environment - no spills, clutter or unnecessary equipment/Purposeful Proactive Rounding/Room/bathroom lighting operational, light cord in reach

## 2023-08-18 NOTE — DISCHARGE NOTE PROVIDER - CARE PROVIDER_API CALL
Carri [address], Admin - Room [address] [place] [address]  Phone: [phone]  Fax: [phone]  Follow Up Time: 2 weeks   Shweta Quijano  Geriatric Medicine  [address] [place] [address]  Phone: [phone]  Fax: [phone]  Established Patient  Follow Up Time: 2 weeks

## 2023-08-18 NOTE — H&P ADULT - HISTORY OF PRESENT ILLNESS
Patient is an 82 year old female with PMH HTN who presents with lightheadedness and decreased po intake for the past 3 weeks.  Patient states that on 7/26 she went to the dentist and she got dentures and the dental office used a paste on the dentures which made her nauseas and now every time she eats she tastes the bad taste of the dentures and cannot eat.  Sometimes she is able to quickly swallow broth but not chew foods.  She states that not being able to eat properly has led to her being lightheaded all the time.  She oftentimes feels like she will pass out but has never passed out.  Patient has hypertension which she self treats with garlic. She also feels constipated from lack of eating real food over the past few weeks.    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 Otherwise no significant stenosis of the vessels of the head and neck. Bilateral thyroid nodules measuring up to 1.4 cm in the right thyroid lobe. May consider nonemergent outpatient thyroid ultrasound for further evaluation.  Ct head non con: No evidence for acute intracranial pathology.

## 2023-08-19 LAB
ANION GAP SERPL CALC-SCNC: 14 MMOL/L — SIGNIFICANT CHANGE UP (ref 7–14)
BUN SERPL-MCNC: 6 MG/DL — LOW (ref 10–20)
CALCIUM SERPL-MCNC: 9.3 MG/DL — SIGNIFICANT CHANGE UP (ref 8.4–10.5)
CHLORIDE SERPL-SCNC: 104 MMOL/L — SIGNIFICANT CHANGE UP (ref 98–110)
CO2 SERPL-SCNC: 22 MMOL/L — SIGNIFICANT CHANGE UP (ref 17–32)
CREAT SERPL-MCNC: 0.9 MG/DL — SIGNIFICANT CHANGE UP (ref 0.7–1.5)
EGFR: 64 ML/MIN/1.73M2 — SIGNIFICANT CHANGE UP
GLUCOSE SERPL-MCNC: 123 MG/DL — HIGH (ref 70–99)
HCT VFR BLD CALC: 43.1 % — SIGNIFICANT CHANGE UP (ref 37–47)
HGB BLD-MCNC: 14.6 G/DL — SIGNIFICANT CHANGE UP (ref 12–16)
MCHC RBC-ENTMCNC: 31.2 PG — HIGH (ref 27–31)
MCHC RBC-ENTMCNC: 33.9 G/DL — SIGNIFICANT CHANGE UP (ref 32–37)
MCV RBC AUTO: 92.1 FL — SIGNIFICANT CHANGE UP (ref 81–99)
NRBC # BLD: 0 /100 WBCS — SIGNIFICANT CHANGE UP (ref 0–0)
PLATELET # BLD AUTO: 330 K/UL — SIGNIFICANT CHANGE UP (ref 130–400)
PMV BLD: 10 FL — SIGNIFICANT CHANGE UP (ref 7.4–10.4)
POTASSIUM SERPL-MCNC: 3.5 MMOL/L — SIGNIFICANT CHANGE UP (ref 3.5–5)
POTASSIUM SERPL-SCNC: 3.5 MMOL/L — SIGNIFICANT CHANGE UP (ref 3.5–5)
RBC # BLD: 4.68 M/UL — SIGNIFICANT CHANGE UP (ref 4.2–5.4)
RBC # FLD: 12.9 % — SIGNIFICANT CHANGE UP (ref 11.5–14.5)
SODIUM SERPL-SCNC: 140 MMOL/L — SIGNIFICANT CHANGE UP (ref 135–146)
T4 FREE SERPL-MCNC: 1.8 NG/DL — SIGNIFICANT CHANGE UP (ref 0.9–1.8)
TSH SERPL-MCNC: 1.4 UIU/ML — SIGNIFICANT CHANGE UP (ref 0.27–4.2)
WBC # BLD: 8.65 K/UL — SIGNIFICANT CHANGE UP (ref 4.8–10.8)
WBC # FLD AUTO: 8.65 K/UL — SIGNIFICANT CHANGE UP (ref 4.8–10.8)

## 2023-08-19 PROCEDURE — 99232 SBSQ HOSP IP/OBS MODERATE 35: CPT

## 2023-08-19 RX ORDER — LOSARTAN POTASSIUM 100 MG/1
50 TABLET, FILM COATED ORAL DAILY
Refills: 0 | Status: DISCONTINUED | OUTPATIENT
Start: 2023-08-19 | End: 2023-08-24

## 2023-08-19 RX ORDER — METOCLOPRAMIDE HCL 10 MG
10 TABLET ORAL ONCE
Refills: 0 | Status: COMPLETED | OUTPATIENT
Start: 2023-08-19 | End: 2023-08-19

## 2023-08-19 RX ORDER — PANTOPRAZOLE SODIUM 20 MG/1
40 TABLET, DELAYED RELEASE ORAL DAILY
Refills: 0 | Status: DISCONTINUED | OUTPATIENT
Start: 2023-08-19 | End: 2023-08-23

## 2023-08-19 RX ORDER — ONDANSETRON 8 MG/1
4 TABLET, FILM COATED ORAL THREE TIMES A DAY
Refills: 0 | Status: DISCONTINUED | OUTPATIENT
Start: 2023-08-19 | End: 2023-08-26

## 2023-08-19 RX ADMIN — ONDANSETRON 4 MILLIGRAM(S): 8 TABLET, FILM COATED ORAL at 12:52

## 2023-08-19 RX ADMIN — LOSARTAN POTASSIUM 50 MILLIGRAM(S): 100 TABLET, FILM COATED ORAL at 12:52

## 2023-08-19 RX ADMIN — HEPARIN SODIUM 5000 UNIT(S): 5000 INJECTION INTRAVENOUS; SUBCUTANEOUS at 05:48

## 2023-08-19 RX ADMIN — HEPARIN SODIUM 5000 UNIT(S): 5000 INJECTION INTRAVENOUS; SUBCUTANEOUS at 17:03

## 2023-08-19 RX ADMIN — Medication 60 MILLIGRAM(S): at 21:29

## 2023-08-19 RX ADMIN — Medication 10 MILLIGRAM(S): at 03:45

## 2023-08-19 RX ADMIN — ONDANSETRON 4 MILLIGRAM(S): 8 TABLET, FILM COATED ORAL at 21:31

## 2023-08-19 RX ADMIN — PANTOPRAZOLE SODIUM 40 MILLIGRAM(S): 20 TABLET, DELAYED RELEASE ORAL at 05:51

## 2023-08-19 NOTE — PROGRESS NOTE ADULT - ASSESSMENT
82 year old female with PMH HTN who presents with lightheadedness, spinning sensation, nausea and decreased po intake for the past 3 weeks.      A/P:   Dizziness and Vertigo:   Patient is reporting lightheadedness and spinning sensation for few days, symptoms got worse so she came to ED,   BP was elevated, CT head showed no acute infarct. 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   Neuro exam today: CN II-XII intact, no nystagmus, Strength 5/5 in all limbs, reflexes normal, Finger to nose normal, gait is normal.   I had a concern if Vertigo, nausea could be from central cause like acute cerebellar infarct, especially she presented with high BP, but the fact neuro exam was normal, symptoms reported for few days and Brain CT was negative, make it less likely, no vertigo today.   She has nausea and reports episode of diarrhea, may explain the nausea from gastroenteritis.   Orthostatic changes were negative Hb normal.   EKG showed Normal Sinus Rhythm. No ST or T changes, Troponin x1 negative.     HTN urgency:   not on treatment, likely will need two agent  Started on Nifedipine, add Losartan 25mg daily. Check TSH, echo.     Bilateral thyroid nodules measuring up to 1.4 cm in the right thyroid lobe  Check TSH, FT4.   - May consider nonemergent outpatient thyroid ultrasound for further evaluation.    DVT Prophylaxis: Heparin SC.   #Progress Note Handoff:  Pending (specify): improving nausea, monitor diarrhea.   Family discussion:  Disposition: Home in 24hrs.

## 2023-08-20 LAB
ANION GAP SERPL CALC-SCNC: 13 MMOL/L — SIGNIFICANT CHANGE UP (ref 7–14)
BUN SERPL-MCNC: 10 MG/DL — SIGNIFICANT CHANGE UP (ref 10–20)
CALCIUM SERPL-MCNC: 9 MG/DL — SIGNIFICANT CHANGE UP (ref 8.4–10.5)
CHLORIDE SERPL-SCNC: 102 MMOL/L — SIGNIFICANT CHANGE UP (ref 98–110)
CO2 SERPL-SCNC: 25 MMOL/L — SIGNIFICANT CHANGE UP (ref 17–32)
CREAT SERPL-MCNC: 1.1 MG/DL — SIGNIFICANT CHANGE UP (ref 0.7–1.5)
EGFR: 50 ML/MIN/1.73M2 — LOW
GLUCOSE SERPL-MCNC: 117 MG/DL — HIGH (ref 70–99)
HCT VFR BLD CALC: 42.9 % — SIGNIFICANT CHANGE UP (ref 37–47)
HGB BLD-MCNC: 14.3 G/DL — SIGNIFICANT CHANGE UP (ref 12–16)
MCHC RBC-ENTMCNC: 30.7 PG — SIGNIFICANT CHANGE UP (ref 27–31)
MCHC RBC-ENTMCNC: 33.3 G/DL — SIGNIFICANT CHANGE UP (ref 32–37)
MCV RBC AUTO: 92.1 FL — SIGNIFICANT CHANGE UP (ref 81–99)
NRBC # BLD: 0 /100 WBCS — SIGNIFICANT CHANGE UP (ref 0–0)
PLATELET # BLD AUTO: 299 K/UL — SIGNIFICANT CHANGE UP (ref 130–400)
PMV BLD: 9.7 FL — SIGNIFICANT CHANGE UP (ref 7.4–10.4)
POTASSIUM SERPL-MCNC: 3.3 MMOL/L — LOW (ref 3.5–5)
POTASSIUM SERPL-SCNC: 3.3 MMOL/L — LOW (ref 3.5–5)
RBC # BLD: 4.66 M/UL — SIGNIFICANT CHANGE UP (ref 4.2–5.4)
RBC # FLD: 12.9 % — SIGNIFICANT CHANGE UP (ref 11.5–14.5)
SODIUM SERPL-SCNC: 140 MMOL/L — SIGNIFICANT CHANGE UP (ref 135–146)
WBC # BLD: 8.66 K/UL — SIGNIFICANT CHANGE UP (ref 4.8–10.8)
WBC # FLD AUTO: 8.66 K/UL — SIGNIFICANT CHANGE UP (ref 4.8–10.8)

## 2023-08-20 PROCEDURE — 93010 ELECTROCARDIOGRAM REPORT: CPT

## 2023-08-20 PROCEDURE — 99232 SBSQ HOSP IP/OBS MODERATE 35: CPT

## 2023-08-20 RX ORDER — SPIRONOLACTONE 25 MG/1
25 TABLET, FILM COATED ORAL DAILY
Refills: 0 | Status: DISCONTINUED | OUTPATIENT
Start: 2023-08-21 | End: 2023-08-22

## 2023-08-20 RX ORDER — AMLODIPINE BESYLATE 2.5 MG/1
10 TABLET ORAL ONCE
Refills: 0 | Status: COMPLETED | OUTPATIENT
Start: 2023-08-20 | End: 2023-08-20

## 2023-08-20 RX ORDER — SPIRONOLACTONE 25 MG/1
25 TABLET, FILM COATED ORAL DAILY
Refills: 0 | Status: DISCONTINUED | OUTPATIENT
Start: 2023-08-20 | End: 2023-08-20

## 2023-08-20 RX ADMIN — HEPARIN SODIUM 5000 UNIT(S): 5000 INJECTION INTRAVENOUS; SUBCUTANEOUS at 18:02

## 2023-08-20 RX ADMIN — Medication 60 MILLIGRAM(S): at 20:40

## 2023-08-20 RX ADMIN — ONDANSETRON 4 MILLIGRAM(S): 8 TABLET, FILM COATED ORAL at 07:39

## 2023-08-20 RX ADMIN — LOSARTAN POTASSIUM 50 MILLIGRAM(S): 100 TABLET, FILM COATED ORAL at 05:26

## 2023-08-20 RX ADMIN — PANTOPRAZOLE SODIUM 40 MILLIGRAM(S): 20 TABLET, DELAYED RELEASE ORAL at 11:51

## 2023-08-20 RX ADMIN — HEPARIN SODIUM 5000 UNIT(S): 5000 INJECTION INTRAVENOUS; SUBCUTANEOUS at 05:26

## 2023-08-20 RX ADMIN — AMLODIPINE BESYLATE 10 MILLIGRAM(S): 2.5 TABLET ORAL at 01:23

## 2023-08-20 NOTE — PROGRESS NOTE ADULT - ASSESSMENT
82 year old female with PMH HTN who presents with lightheadedness, spinning sensation, nausea and decreased po intake for the past 3 weeks.      A/P:   Dizziness and Vertigo:   Patient is reporting lightheadedness and spinning sensation for few days, symptoms got worse so she came to ED,   BP was elevated, CT head showed no acute infarct. 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   Neuro exam today: CN II-XII intact, no nystagmus, Strength 5/5 in all limbs, reflexes normal, Finger to nose normal, gait is normal.   I had a concern if Vertigo, nausea could be from central cause like acute cerebellar infarct, especially she presented with high BP, but the fact neuro exam was normal, symptoms reported for few days and Brain CT was negative, make it less likely, no vertigo today.   She has nausea and reports episode of diarrhea, now resolved, may explain the nausea from gastroenteritis.   Orthostatic changes were negative Hb normal.   EKG showed Normal Sinus Rhythm. No ST or T changes, Troponin x1 negative.   Echo was normal.     HTN urgency:   not on treatment, likely will need two agent  Started on Nifedipine, add Losartan 25mg daily. Echo was normal.   Send renin activity, aldosterone level, pheo work up.     Bilateral thyroid nodules measuring up to 1.4 cm in the right thyroid lobe  TSH, FT4 are normal.   Outpatient thyroid ultrasound.    DVT Prophylaxis: Heparin SC.   #Progress Note Handoff:  Pending (specify): improving BP,   Family discussion:  Disposition: Home in 24hrs.

## 2023-08-21 ENCOUNTER — TRANSCRIPTION ENCOUNTER (OUTPATIENT)
Age: 83
End: 2023-08-21

## 2023-08-21 LAB
ANION GAP SERPL CALC-SCNC: 12 MMOL/L — SIGNIFICANT CHANGE UP (ref 7–14)
BUN SERPL-MCNC: 14 MG/DL — SIGNIFICANT CHANGE UP (ref 10–20)
CALCIUM SERPL-MCNC: 9.3 MG/DL — SIGNIFICANT CHANGE UP (ref 8.4–10.5)
CHLORIDE SERPL-SCNC: 101 MMOL/L — SIGNIFICANT CHANGE UP (ref 98–110)
CO2 SERPL-SCNC: 27 MMOL/L — SIGNIFICANT CHANGE UP (ref 17–32)
CREAT SERPL-MCNC: 1.2 MG/DL — SIGNIFICANT CHANGE UP (ref 0.7–1.5)
EGFR: 45 ML/MIN/1.73M2 — LOW
GLUCOSE SERPL-MCNC: 108 MG/DL — HIGH (ref 70–99)
HCT VFR BLD CALC: 44.8 % — SIGNIFICANT CHANGE UP (ref 37–47)
HGB BLD-MCNC: 14.8 G/DL — SIGNIFICANT CHANGE UP (ref 12–16)
MCHC RBC-ENTMCNC: 30.6 PG — SIGNIFICANT CHANGE UP (ref 27–31)
MCHC RBC-ENTMCNC: 33 G/DL — SIGNIFICANT CHANGE UP (ref 32–37)
MCV RBC AUTO: 92.6 FL — SIGNIFICANT CHANGE UP (ref 81–99)
NRBC # BLD: 0 /100 WBCS — SIGNIFICANT CHANGE UP (ref 0–0)
PLATELET # BLD AUTO: 325 K/UL — SIGNIFICANT CHANGE UP (ref 130–400)
PMV BLD: 10.1 FL — SIGNIFICANT CHANGE UP (ref 7.4–10.4)
POTASSIUM SERPL-MCNC: 3.8 MMOL/L — SIGNIFICANT CHANGE UP (ref 3.5–5)
POTASSIUM SERPL-SCNC: 3.8 MMOL/L — SIGNIFICANT CHANGE UP (ref 3.5–5)
RBC # BLD: 4.84 M/UL — SIGNIFICANT CHANGE UP (ref 4.2–5.4)
RBC # FLD: 12.8 % — SIGNIFICANT CHANGE UP (ref 11.5–14.5)
SODIUM SERPL-SCNC: 140 MMOL/L — SIGNIFICANT CHANGE UP (ref 135–146)
WBC # BLD: 8.93 K/UL — SIGNIFICANT CHANGE UP (ref 4.8–10.8)
WBC # FLD AUTO: 8.93 K/UL — SIGNIFICANT CHANGE UP (ref 4.8–10.8)

## 2023-08-21 PROCEDURE — 70551 MRI BRAIN STEM W/O DYE: CPT | Mod: 26

## 2023-08-21 PROCEDURE — 99232 SBSQ HOSP IP/OBS MODERATE 35: CPT

## 2023-08-21 RX ORDER — SENNA PLUS 8.6 MG/1
2 TABLET ORAL AT BEDTIME
Refills: 0 | Status: DISCONTINUED | OUTPATIENT
Start: 2023-08-21 | End: 2023-08-22

## 2023-08-21 RX ORDER — POLYETHYLENE GLYCOL 3350 17 G/17G
17 POWDER, FOR SOLUTION ORAL
Refills: 0 | Status: DISCONTINUED | OUTPATIENT
Start: 2023-08-21 | End: 2023-08-22

## 2023-08-21 RX ORDER — HYDRALAZINE HCL 50 MG
10 TABLET ORAL ONCE
Refills: 0 | Status: COMPLETED | OUTPATIENT
Start: 2023-08-21 | End: 2023-08-21

## 2023-08-21 RX ORDER — HEPARIN SODIUM 5000 [USP'U]/ML
5000 INJECTION INTRAVENOUS; SUBCUTANEOUS EVERY 8 HOURS
Refills: 0 | Status: DISCONTINUED | OUTPATIENT
Start: 2023-08-21 | End: 2023-08-26

## 2023-08-21 RX ORDER — NIFEDIPINE 30 MG
90 TABLET, EXTENDED RELEASE 24 HR ORAL AT BEDTIME
Refills: 0 | Status: DISCONTINUED | OUTPATIENT
Start: 2023-08-21 | End: 2023-08-26

## 2023-08-21 RX ORDER — INSULIN LISPRO 100/ML
4 VIAL (ML) SUBCUTANEOUS ONCE
Refills: 0 | Status: DISCONTINUED | OUTPATIENT
Start: 2023-08-21 | End: 2023-08-21

## 2023-08-21 RX ORDER — PANTOPRAZOLE SODIUM 20 MG/1
40 TABLET, DELAYED RELEASE ORAL ONCE
Refills: 0 | Status: COMPLETED | OUTPATIENT
Start: 2023-08-21 | End: 2023-08-21

## 2023-08-21 RX ADMIN — HEPARIN SODIUM 5000 UNIT(S): 5000 INJECTION INTRAVENOUS; SUBCUTANEOUS at 06:06

## 2023-08-21 RX ADMIN — POLYETHYLENE GLYCOL 3350 17 GRAM(S): 17 POWDER, FOR SOLUTION ORAL at 17:01

## 2023-08-21 RX ADMIN — HEPARIN SODIUM 5000 UNIT(S): 5000 INJECTION INTRAVENOUS; SUBCUTANEOUS at 15:18

## 2023-08-21 RX ADMIN — Medication 90 MILLIGRAM(S): at 21:40

## 2023-08-21 RX ADMIN — SPIRONOLACTONE 25 MILLIGRAM(S): 25 TABLET, FILM COATED ORAL at 06:07

## 2023-08-21 RX ADMIN — LOSARTAN POTASSIUM 50 MILLIGRAM(S): 100 TABLET, FILM COATED ORAL at 06:07

## 2023-08-21 RX ADMIN — HEPARIN SODIUM 5000 UNIT(S): 5000 INJECTION INTRAVENOUS; SUBCUTANEOUS at 21:40

## 2023-08-21 RX ADMIN — POLYETHYLENE GLYCOL 3350 17 GRAM(S): 17 POWDER, FOR SOLUTION ORAL at 06:06

## 2023-08-21 RX ADMIN — Medication 10 MILLIGRAM(S): at 06:07

## 2023-08-21 RX ADMIN — ONDANSETRON 4 MILLIGRAM(S): 8 TABLET, FILM COATED ORAL at 01:31

## 2023-08-21 RX ADMIN — SENNA PLUS 2 TABLET(S): 8.6 TABLET ORAL at 06:07

## 2023-08-21 RX ADMIN — ONDANSETRON 4 MILLIGRAM(S): 8 TABLET, FILM COATED ORAL at 11:11

## 2023-08-21 RX ADMIN — SENNA PLUS 2 TABLET(S): 8.6 TABLET ORAL at 21:39

## 2023-08-21 RX ADMIN — Medication 0.5 MILLIGRAM(S): at 11:49

## 2023-08-21 RX ADMIN — PANTOPRAZOLE SODIUM 40 MILLIGRAM(S): 20 TABLET, DELAYED RELEASE ORAL at 11:11

## 2023-08-21 RX ADMIN — PANTOPRAZOLE SODIUM 40 MILLIGRAM(S): 20 TABLET, DELAYED RELEASE ORAL at 06:06

## 2023-08-21 NOTE — PROGRESS NOTE ADULT - ASSESSMENT
Patient is an 82 year old female with PMH HTN who presents with lightheadedness and decreased po intake for the past 3 weeks.  Patient states that on 7/26 she went to the dentist and she got dentures and the dental office used a paste on the dentures which made her nauseas and now every time she eats she tastes the bad taste of the dentures and cannot eat.  Sometimes she is able to quickly swallow broth but not chew foods.  She states that not being able to eat properly has led to her being lightheaded all the time.  She oftentimes feels like she will pass out but has never passed out.  Patient has hypertension which she self treats with garlic. She also feels constipated from lack of eating real food over the past few weeks.    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 Otherwise no significant stenosis of the vessels of the head and neck. Bilateral thyroid nodules measuring up to 1.4 cm in the right thyroid lobe. May consider nonemergent outpatient thyroid ultrasound for further evaluation.  Ct head non con: No evidence for acute intracranial pathology.      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 Otherwise no significant stenosis of the vessels of the head and neck. Bilateral thyroid nodules measuring up to 1.4 cm in the right thyroid lobe. May consider nonemergent outpatient thyroid ultrasound for further evaluation.  -Ct head non con: No evidence for acute intracranial pathology.  -Patient states that on 7/26 she went to the dentist and she got dentures and the dental office used a paste on the dentures which made her nauseas and now every time she eats she tastes the bad taste of the dentures and cannot eat.    -she states the bad taste and lack of eating causes her to be lightheaded and has almost passed out numberous times  -dental consult for possible alterations in dentures  -obtain echo, orthostatics  -S&S: thin liquids soft and bite-sized   -started IV fluids  -Patient still symptomatic with nausea and dizziness. Ordered head MRI    #HTN  -/102 on admission  -patient uses garlic as treatment   -started nifedipine 60 mg, Spironolactone 25 mg, and Losartan 50 mg  -BP now 150-180/70-80    #Constipation  -Started senna/miralax    #Bilateral thyroid nodules measuring up to 1.4 cm in the right thyroid lobe  -May consider nonemergent outpatient thyroid ultrasound for further evaluation.    #DVT ppx: Heparin  #GI ppx: Pantoprazole 40 mg  #Diet: DASH/TLC/Soft  #Code: Full  #Dispo: Home    Pending:  Head MRI

## 2023-08-21 NOTE — DISCHARGE NOTE NURSING/CASE MANAGEMENT/SOCIAL WORK - NSDCPEFALRISK_GEN_ALL_CORE
For information on Fall & Injury Prevention, visit: [email or web address] OR  [email or web address] OR  https://www.cdc.gov/steadi/patient.html

## 2023-08-21 NOTE — DISCHARGE NOTE NURSING/CASE MANAGEMENT/SOCIAL WORK - PATIENT PORTAL LINK FT
You can access the FollowMyHealth Patient Portal offered by [facility] by registering at the following website: [email or web address]. By joining [employer]’s FollowMyHealth portal, you will also be able to view your health information using other applications (apps) compatible with our system.

## 2023-08-21 NOTE — PROGRESS NOTE ADULT - ASSESSMENT
81 y/o woman with PMH of HTN on garlic presented with lightheadedness and decreased po intake for the past 3 weeks.      1. Near syncope (lightheaded sensation per pt)  initially attributed to hypertensive urgency and decreased PO intake  CT head: no acute intracranial pathology  CTA of head and neck: mild to mod stenosis of left PCA  pt agreeable for medical therapy for HTN- continue Nifedipine XL 60mg daily and losartan 50mg daily - BP better controlled  low sodium soft and bite sized diet  telemetry no events  ECHO: EF 59%  negative orthostatics  check MRI of brain since pt is still symptomatic and she had markedly elevated BP on presentation    2. Decreased PO intake with nausea  on zofran prn  pt stopped using denture past and will f/u with her own dentist for re-evaluation  pt now c/o constipation - miralax and senna ordered and monitor    3. Thyroid nodules - outpt thyroid US   TSH and fT4 WNL    4. DVT prophylaxis - OOB and ambulate - on heparin SQ    full code  guarded prognosis      PROGRESS NOTE HANDOFF    Pending: improved PO intake, MRI of brain  pt informed of the plan of care  Disposition: home with care possibly in 24hrs if BP remains acceptable, nausea and lightheadedness improves and after review of MRI of brain

## 2023-08-22 LAB
A1C WITH ESTIMATED AVERAGE GLUCOSE RESULT: 5.8 % — HIGH (ref 4–5.6)
ALDOST SERPL-MCNC: <3 NG/DL — SIGNIFICANT CHANGE UP
CHOLEST SERPL-MCNC: 177 MG/DL — SIGNIFICANT CHANGE UP
ESTIMATED AVERAGE GLUCOSE: 120 MG/DL — HIGH (ref 68–114)
HDLC SERPL-MCNC: 47 MG/DL — LOW
LIPID PNL WITH DIRECT LDL SERPL: 107 MG/DL — HIGH
NON HDL CHOLESTEROL: 130 MG/DL — HIGH
TRIGL SERPL-MCNC: 113 MG/DL — SIGNIFICANT CHANGE UP

## 2023-08-22 PROCEDURE — 99232 SBSQ HOSP IP/OBS MODERATE 35: CPT

## 2023-08-22 RX ORDER — POLYETHYLENE GLYCOL 3350 17 G/17G
17 POWDER, FOR SOLUTION ORAL
Refills: 0 | Status: DISCONTINUED | OUTPATIENT
Start: 2023-08-22 | End: 2023-08-26

## 2023-08-22 RX ORDER — SENNA PLUS 8.6 MG/1
2 TABLET ORAL AT BEDTIME
Refills: 0 | Status: DISCONTINUED | OUTPATIENT
Start: 2023-08-22 | End: 2023-08-26

## 2023-08-22 RX ADMIN — Medication 30 MILLILITER(S): at 05:43

## 2023-08-22 RX ADMIN — ONDANSETRON 4 MILLIGRAM(S): 8 TABLET, FILM COATED ORAL at 02:33

## 2023-08-22 RX ADMIN — POLYETHYLENE GLYCOL 3350 17 GRAM(S): 17 POWDER, FOR SOLUTION ORAL at 05:08

## 2023-08-22 RX ADMIN — SPIRONOLACTONE 25 MILLIGRAM(S): 25 TABLET, FILM COATED ORAL at 05:08

## 2023-08-22 RX ADMIN — ONDANSETRON 4 MILLIGRAM(S): 8 TABLET, FILM COATED ORAL at 15:49

## 2023-08-22 RX ADMIN — HEPARIN SODIUM 5000 UNIT(S): 5000 INJECTION INTRAVENOUS; SUBCUTANEOUS at 21:45

## 2023-08-22 RX ADMIN — Medication 90 MILLIGRAM(S): at 21:44

## 2023-08-22 RX ADMIN — LOSARTAN POTASSIUM 50 MILLIGRAM(S): 100 TABLET, FILM COATED ORAL at 05:08

## 2023-08-22 RX ADMIN — HEPARIN SODIUM 5000 UNIT(S): 5000 INJECTION INTRAVENOUS; SUBCUTANEOUS at 05:08

## 2023-08-22 RX ADMIN — HEPARIN SODIUM 5000 UNIT(S): 5000 INJECTION INTRAVENOUS; SUBCUTANEOUS at 13:15

## 2023-08-22 RX ADMIN — PANTOPRAZOLE SODIUM 40 MILLIGRAM(S): 20 TABLET, DELAYED RELEASE ORAL at 12:40

## 2023-08-22 NOTE — PROGRESS NOTE ADULT - ASSESSMENT
Patient is an 82 year old female with PMH HTN who presents with lightheadedness and decreased po intake for the past 3 weeks.  Patient states that on 7/26 she went to the dentist and she got dentures and the dental office used a paste on the dentures which made her nauseas and now every time she eats she tastes the bad taste of the dentures and cannot eat.  Sometimes she is able to quickly swallow broth but not chew foods.  She states that not being able to eat properly has led to her being lightheaded all the time.  She oftentimes feels like she will pass out but has never passed out.  Patient has hypertension which she self treats with garlic. She also feels constipated from lack of eating real food over the past few weeks.    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 Otherwise no significant stenosis of the vessels of the head and neck. Bilateral thyroid nodules measuring up to 1.4 cm in the right thyroid lobe. May consider nonemergent outpatient thyroid ultrasound for further evaluation.  CT head non con: No evidence for acute intracranial pathology.    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 Otherwise no significant stenosis of the vessels of the head and neck. Bilateral thyroid nodules measuring up to 1.4 cm in the right thyroid lobe. May consider nonemergent outpatient thyroid ultrasound for further evaluation.  -Ct head non con: No evidence for acute intracranial pathology.  -Patient states that on 7/26 she went to the dentist and she got dentures and the dental office used a paste on the dentures which made her nauseas and now every time she eats she tastes the bad taste of the dentures and cannot eat.    -she states the bad taste and lack of eating causes her to be lightheaded and has almost passed out numberous times  -dental consult for possible alterations in dentures  -Orthostatics negative  -Echo (8/18): EF 59%  -S&S: thin liquids soft and bite-sized   -d/c IV fluids 8/19  -Patient still symptomatic with nausea and dizziness. Head MRI negative for acute ischemic change. Cerebral and corpus callosal atrophy.  -d/c tely    #HTN  -/102 on admission  -patient uses garlic as treatment   -started nifedipine 60 mg>> increased to nifedipine 90 mg 8/21  -d/c Spironolactone 25 mg  -c/w Losartan 50 mg  -BP now 150-180/70-80  -plasma metanephrines, renin, aldosterone ordered and pending     #Decreased PO intake with nausea  -on zofran PRN  -pt stopped using denture-- will f/u with her own dentist for re-evaluation OP    #Constipation, resolved  -senna/miralax changed to PRN, as patient now complains of diarrhea    #Bilateral thyroid nodules measuring up to 1.4 cm in the right thyroid lobe  -TSH and fT4 WNL  -May consider nonemergent outpatient thyroid ultrasound for further evaluation.    #DVT ppx: Heparin  #GI ppx: Pantoprazole 40 mg  #Diet: DASH/TLC/Soft  #Code: Full  #Dispo: Home    Pending: repeat BP in afternoon, anticipate d/c tomorrow if BP controlled   Patient is an 82 year old female with PMH HTN who presents with lightheadedness and decreased po intake for the past 3 weeks.  Patient states that on 7/26 she went to the dentist and she got dentures and the dental office used a paste on the dentures which made her nauseas and now every time she eats she tastes the bad taste of the dentures and cannot eat.  Sometimes she is able to quickly swallow broth but not chew foods.  She states that not being able to eat properly has led to her being lightheaded all the time.  She oftentimes feels like she will pass out but has never passed out.  Patient has hypertension which she self treats with garlic. She also feels constipated from lack of eating real food over the past few weeks.    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 Otherwise no significant stenosis of the vessels of the head and neck. Bilateral thyroid nodules measuring up to 1.4 cm in the right thyroid lobe. May consider nonemergent outpatient thyroid ultrasound for further evaluation.  CT head non con: No evidence for acute intracranial pathology.    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 Otherwise no significant stenosis of the vessels of the head and neck. Bilateral thyroid nodules measuring up to 1.4 cm in the right thyroid lobe. May consider nonemergent outpatient thyroid ultrasound for further evaluation.  -Ct head non con: No evidence for acute intracranial pathology.  -Patient states that on 7/26 she went to the dentist and she got dentures and the dental office used a paste on the dentures which made her nauseas and now every time she eats she tastes the bad taste of the dentures and cannot eat.    -she states the bad taste and lack of eating causes her to be lightheaded and has almost passed out numberous times  -dental consult for possible alterations in dentures  -Orthostatics negative  -Echo (8/18): EF 59%  -S&S: thin liquids soft and bite-sized   -d/c IV fluids 8/19  -Patient still symptomatic with nausea and dizziness. Head MRI negative for acute ischemic change. Cerebral and corpus callosal atrophy.  -d/c tely    #HTN  -/102 on admission  -patient uses garlic as treatment   -started nifedipine 60 mg>> increased to nifedipine 90 mg 8/21  -d/c Spironolactone 25 mg  -c/w Losartan 50 mg  -BP now 150-180/70-80  -plasma metanephrines, renin, aldosterone ordered and pending     #Decreased PO intake with nausea  -on zofran PRN  -pt stopped using denture-- will f/u with her own dentist for re-evaluation OP    #Constipation, resolved  -senna/miralax changed to PRN, as patient now complains of diarrhea    #Bilateral thyroid nodules measuring up to 1.4 cm in the right thyroid lobe  -TSH and fT4 WNL  -May consider nonemergent outpatient thyroid ultrasound for further evaluation.    #DVT ppx: Heparin  #GI ppx: Pantoprazole 40 mg  #Diet: DASH/TLC/Soft  #Code: Full  #Dispo: Home    Pending: repeat BP in afternoon, anticipate d/c tomorrow if BP controlled    	                                                                             PLEASE NOTE: Patient to follow up at [facility] clinic Dr. Quijano 9/7/23 2:15 PM       Patient is an 82 year old female with PMH HTN who presents with lightheadedness and decreased po intake for the past 3 weeks.  Patient states that on 7/26 she went to the dentist and she got dentures and the dental office used a paste on the dentures which made her nauseas and now every time she eats she tastes the bad taste of the dentures and cannot eat.  Sometimes she is able to quickly swallow broth but not chew foods.  She states that not being able to eat properly has led to her being lightheaded all the time.  She oftentimes feels like she will pass out but has never passed out.  Patient has hypertension which she self treats with garlic. She also feels constipated from lack of eating real food over the past few weeks.    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 Otherwise no significant stenosis of the vessels of the head and neck. Bilateral thyroid nodules measuring up to 1.4 cm in the right thyroid lobe. May consider nonemergent outpatient thyroid ultrasound for further evaluation.  CT head non con: No evidence for acute intracranial pathology.    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 Otherwise no significant stenosis of the vessels of the head and neck. Bilateral thyroid nodules measuring up to 1.4 cm in the right thyroid lobe. May consider nonemergent outpatient thyroid ultrasound for further evaluation.  -Ct head non con: No evidence for acute intracranial pathology.  -Patient states that on 7/26 she went to the dentist and she got dentures and the dental office used a paste on the dentures which made her nauseas and now every time she eats she tastes the bad taste of the dentures and cannot eat.    -she states the bad taste and lack of eating causes her to be lightheaded and has almost passed out numberous times  -dental consult for possible alterations in dentures  -Orthostatics negative  -Echo (8/18): EF 59%  -S&S: thin liquids soft and bite-sized   -d/c IV fluids 8/19  -Patient still symptomatic with nausea and dizziness. Head MRI negative for acute ischemic change. Cerebral and corpus callosal atrophy.  -d/c tely    #HTN  -/102 on admission  -patient uses garlic as treatment   -started nifedipine 60 mg>> increased to nifedipine 90 mg 8/21  -d/c Spironolactone 25 mg  -c/w Losartan 50 mg  -BP now 150-180/70-80  -plasma metanephrines, renin, aldosterone ordered and pending     #Decreased PO intake with nausea  -on zofran PRN  -pt stopped using denture-- will f/u with her own dentist for re-evaluation OP    #Constipation, resolved  -senna/miralax changed to PRN, as patient now complains of diarrhea    #Bilateral thyroid nodules measuring up to 1.4 cm in the right thyroid lobe  -TSH and fT4 WNL  -May consider nonemergent outpatient thyroid ultrasound for further evaluation.    #DVT ppx: Heparin  #GI ppx: Pantoprazole 40 mg  #Diet: DASH/TLC/Soft  #Code: Full  #Dispo: Home    Pending: repeat BP in afternoon, anticipate d/c tomorrow if BP controlled    PLEASE NOTE: Patient to follow up at [facility] clinic Dr. Quijano 9/7/23 2:15 PM

## 2023-08-22 NOTE — PROGRESS NOTE ADULT - ASSESSMENT
81 y/o woman with PMH of HTN on garlic presented with lightheadedness and decreased po intake for the past 3 weeks.      1. 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 Decreased PO intake with nausea  on zofran prn  pt stopped using denture paste and will f/u with her own dentist for re-evaluation  pt c/o constipation - miralax and senna ordered and she had BM -> now with diarrhea - laxatives now prn and monitor    3. Thyroid nodules - outpt thyroid US   TSH and fT4 WNL    4. DVT prophylaxis - OOB and ambulate - on heparin SQ    full code  guarded prognosis but improving      PROGRESS NOTE HANDOFF    Pending: monitor BP today (nifedipine was increased last night)  pt informed of the plan of care  Disposition: home with care in 24hrs if remains stable  [facility] appt will be given   81 y/o woman with PMH of HTN on garlic presented with lightheadedness and decreased po intake for the past 3 weeks.      1. 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 Decreased PO intake with nausea  on zofran prn  pt stopped using denture paste and will f/u with her own dentist for re-evaluation  pt c/o constipation - miralax and senna ordered and she had BM -> now with diarrhea - laxatives now prn and monitor    3. Thyroid nodules - outpt thyroid US   TSH and fT4 WNL    4. DVT prophylaxis - OOB and ambulate - on heparin SQ    full code  guarded prognosis but improving      PROGRESS NOTE HANDOFF    Pending: monitor BP today (nifedipine was increased last night)  pt informed of the plan of care  Disposition: home with care in 24hrs if remains stable    [facility] appt: Sept 7 at 2:15PM with Dr. Quijano

## 2023-08-23 LAB
ANION GAP SERPL CALC-SCNC: 14 MMOL/L — SIGNIFICANT CHANGE UP (ref 7–14)
BUN SERPL-MCNC: 17 MG/DL — SIGNIFICANT CHANGE UP (ref 10–20)
CALCIUM SERPL-MCNC: 8.8 MG/DL — SIGNIFICANT CHANGE UP (ref 8.4–10.5)
CHLORIDE SERPL-SCNC: 102 MMOL/L — SIGNIFICANT CHANGE UP (ref 98–110)
CO2 SERPL-SCNC: 26 MMOL/L — SIGNIFICANT CHANGE UP (ref 17–32)
CREAT SERPL-MCNC: 1.4 MG/DL — SIGNIFICANT CHANGE UP (ref 0.7–1.5)
EGFR: 38 ML/MIN/1.73M2 — LOW
GLUCOSE SERPL-MCNC: 109 MG/DL — HIGH (ref 70–99)
MAGNESIUM SERPL-MCNC: 2.4 MG/DL — SIGNIFICANT CHANGE UP (ref 1.8–2.4)
POTASSIUM SERPL-MCNC: 3.8 MMOL/L — SIGNIFICANT CHANGE UP (ref 3.5–5)
POTASSIUM SERPL-SCNC: 3.8 MMOL/L — SIGNIFICANT CHANGE UP (ref 3.5–5)
SODIUM SERPL-SCNC: 142 MMOL/L — SIGNIFICANT CHANGE UP (ref 135–146)

## 2023-08-23 PROCEDURE — 99232 SBSQ HOSP IP/OBS MODERATE 35: CPT

## 2023-08-23 RX ORDER — PANTOPRAZOLE SODIUM 20 MG/1
40 TABLET, DELAYED RELEASE ORAL
Refills: 0 | Status: DISCONTINUED | OUTPATIENT
Start: 2023-08-23 | End: 2023-08-26

## 2023-08-23 RX ADMIN — ONDANSETRON 4 MILLIGRAM(S): 8 TABLET, FILM COATED ORAL at 02:24

## 2023-08-23 RX ADMIN — PANTOPRAZOLE SODIUM 40 MILLIGRAM(S): 20 TABLET, DELAYED RELEASE ORAL at 11:07

## 2023-08-23 RX ADMIN — HEPARIN SODIUM 5000 UNIT(S): 5000 INJECTION INTRAVENOUS; SUBCUTANEOUS at 05:10

## 2023-08-23 RX ADMIN — Medication 90 MILLIGRAM(S): at 21:24

## 2023-08-23 RX ADMIN — HEPARIN SODIUM 5000 UNIT(S): 5000 INJECTION INTRAVENOUS; SUBCUTANEOUS at 21:25

## 2023-08-23 RX ADMIN — PANTOPRAZOLE SODIUM 40 MILLIGRAM(S): 20 TABLET, DELAYED RELEASE ORAL at 17:02

## 2023-08-23 RX ADMIN — HEPARIN SODIUM 5000 UNIT(S): 5000 INJECTION INTRAVENOUS; SUBCUTANEOUS at 13:29

## 2023-08-23 RX ADMIN — ONDANSETRON 4 MILLIGRAM(S): 8 TABLET, FILM COATED ORAL at 16:17

## 2023-08-23 RX ADMIN — LOSARTAN POTASSIUM 50 MILLIGRAM(S): 100 TABLET, FILM COATED ORAL at 05:09

## 2023-08-23 RX ADMIN — Medication 30 MILLILITER(S): at 00:06

## 2023-08-23 NOTE — PROGRESS NOTE ADULT - ASSESSMENT
81 y/o woman with PMH of HTN on garlic presented with lightheadedness and decreased po intake for the past 3 weeks.      1. Near syncope (lightheaded sensation per pt)  attributed to hypertensive urgency and decreased PO intake  CT head: no acute intracranial pathology  CTA of head and neck: mild to mod stenosis of left PCA  MRI of brain: no acute ischemic change  pt agreeable for medical therapy for HTN- on Nifedipine XL 90mg daily and losartan 50mg daily   check BMP in AM (Cr 1.4)  low sodium soft and bite sized diet  telemetry had no events   ECHO: EF 59%  negative orthostatics    2. Decreased PO intake with nausea  on zofran prn  pt stopped using denture paste and will f/u with her own dentist for re-evaluation  heartburn sensation - change PPI to q12hr and monitor    3. Thyroid nodules - outpt thyroid US   TSH and fT4 WNL    4. DVT prophylaxis - OOB and ambulate - on heparin SQ      full code        PROGRESS NOTE HANDOFF    Pending: monitor BP today, BMP in AM, improvement in heartburn and nausea  pt informed of the plan of care  Disposition: home with care in 24hrs if improves    [facility] appt: Sept 7 at 2:15PM with Dr. Quijano

## 2023-08-23 NOTE — PHYSICAL THERAPY INITIAL EVALUATION ADULT - PERTINENT HX OF CURRENT PROBLEM, REHAB EVAL
Patient is an 82 year old female with PMH HTN who presents with lightheadedness and decreased po intake for the past 3 weeks.  Patient states that on 7/26 she went to the dentist and she got dentures and the dental office used a paste on the dentures which made her nauseas and now every time she eats she tastes the bad taste of the dentures and cannot eat.

## 2023-08-23 NOTE — MEDICAL STUDENT PROGRESS NOTE(EDUCATION) - ASSESSMENT
----------------------------------------------------  # DVT prophylaxis   # GI prophylaxis   # Diet   # Activity Score (AM-PAC)  # Code status   # Disposition  81 y/o woman with PMH of HTN on garlic presented with lightheadedness and decreased po intake for the past 3 weeks. In the ED, she was found to have hypertensive urgency: 231/102.    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  - MRI head (8/21): no acute pathology  - Hypertensive urgency with /102 s/p IV labetalol 10mg and Nifedipine 60mg>>BP improved to 170s/190s  - started on losartan 50mg PO daily on 8/19 -> BP improved to 150s/190s  - spironolactone 25mg was started on 8/21 and d/c on 8/22  - Nifedipine XL 60mg daily was increased to 90mg daily at bedtime on 8/21 -> BP improved to 130s/140s  - cont. DASH and low sodium- soft and bite-sized diet  - No events on Telemetry, EKG NSR - tele d/c on 8/22  - ECHO (8/18) normal with EF 59%  - Orthostatic vitals- negative  - Will need close outpatient f/u for HTN  - BMP in AM tmrw - Cr uptrending 1.4 today (8/23)    #Decreased PO intake due to nausea due to denture paste  - cont. Zofran PRN for nausea  - Pt advised to f/u with her own dentist for re-evaluation  - pt received Miralax and senna daily for constipation but then had diarrhea -> Miralax and senna now given PRN  - heartburn sensation - Protonix 40mg PO increased from QD to BID    #Incidental bilateral Thyroid nodules  - CTA of head and neck (8/18) showed bilateral thyroid nodules measuring up to 1.4cm in R thyroid lobe  - TSH, fT4 lvls within normal range  - recommend outpt thyroid U/S                                                                                  ----------------------------------------------------  # DVT prophylaxis: heparin 5000u SQ q8h  # GI prophylaxis: Protonix 40mg increased from QD to BID  # Diet: soft and bite-sized - DASH/TLC  # Activity Score (AM-PAC): no restrictions - home PT vs. outpt PT  # Code status: full  # Disposition: home 81 y/o woman with PMH of HTN on garlic presented with lightheadedness and decreased po intake for the past 3 weeks. In the ED, she was found to have hypertensive urgency: 231/102.    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  - MRI head (8/21): no acute pathology  - Hypertensive urgency with /102 s/p IV labetalol 10mg and Nifedipine 60mg>>BP improved to 170s/190s  - started on losartan 50mg PO daily on 8/19 -> BP improved to 150s/190s  - spironolactone 25mg was started on 8/21 and d/c on 8/22  - Nifedipine XL 60mg daily was increased to 90mg daily at bedtime on 8/21 -> BP improved to 130s/140s  - cont. DASH and low sodium- soft and bite-sized diet  - No events on Telemetry, EKG NSR - tele d/c on 8/22  - ECHO (8/18) normal with EF 59%  - Orthostatic vitals- negative  - Will need close outpatient f/u for HTN  - BMP in AM tmrw - Cr uptrending 1.4 today (8/23)    #Decreased PO intake due to nausea due to denture paste  - cont. Zofran PRN for nausea  - Pt advised to f/u with her own dentist for re-evaluation  - pt received Miralax and senna daily for constipation but then had diarrhea -> Miralax and senna now given PRN  - heartburn sensation - Protonix 40mg PO increased from QD to BID    #Incidental bilateral Thyroid nodules  - CTA of head and neck (8/18) showed bilateral thyroid nodules measuring up to 1.4cm in R thyroid lobe  - TSH, fT4 lvls within normal range  - recommend outpt thyroid U/S                                                                # DVT prophylaxis: heparin 5000u SQ q8h  # GI prophylaxis: Protonix 40mg increased from QD to BID  # Diet: soft and bite-sized - DASH/TLC  # Activity Score (AM-PAC): no restrictions - home PT vs. outpt PT  # Code status: full  # Disposition: home

## 2023-08-23 NOTE — MEDICAL STUDENT PROGRESS NOTE(EDUCATION) - SUBJECTIVE AND OBJECTIVE BOX
MIKEL PEDROZA 82y Female  MRN#: [id number]   CODE STATUS: FULL    Hospital Day: 5d    Pt is currently admitted with the primary diagnosis of dizziness and giddiness    SUBJECTIVE    There were no overnight events. Pt was seen and examined at bedside today. Pt states that she still has lightheadedness, nausea, and an "aching" pain in her epigastric region; however, it is much improved since admission. She also staed that she has been feeling weakness in her legs from not walking around since admission; PT was consulted: she is able to ambulate without a need for assistive devices. They recommended home PT vs. outpt PT. Later on in the morning, after she was seen by PT, pt was on the phone, tearful, stating that she felt a tight sensation in her left leg. She denies having any pain, only tension. On physical examination, there was no redness, but there was slight edema (+1) in both lower extremities.     The pt explained that she no longer felt safe being home by herself after everything that happened to her in the hospital. She wants to live with her son, but he is moving to a "[employer] 8" place that won't allow her to live with him. She plans to stay with her son for a short while after she is discharged from the hospital but wants to know if anything can be done to help her because she does not want to live alone anymore. When the pt was asked if she would be able to go to the doctor's office, buy groceries, etc., by herself when she gets discharged from the hospital, she stated that she could but she is too scared to. Case Management and Social Work referrals were placed.    Present Today:   - Anai:  No [ X ], Yes [   ] : Indication:     - Type of IV Access:       .. CVC/Piccline:  No [ X ], Yes [   ] : Indication:       .. Midline: No [ X ], Yes [   ] : Indication:                                             ----------------------------------------------------------  OBJECTIVE  PAST MEDICAL & SURGICAL HISTORY                                            -----------------------------------------------------------  ALLERGIES:  Allergy Status Unknown                                            ------------------------------------------------------------    HOME MEDICATIONS  Home Medications:                           MEDICATIONS:  STANDING MEDICATIONS  heparin   Injectable 5000 Unit(s) SubCutaneous every 8 hours  losartan 50 milliGRAM(s) Oral daily  NIFEdipine XL 90 milliGRAM(s) Oral at bedtime  pantoprazole    Tablet 40 milliGRAM(s) Oral two times a day    PRN MEDICATIONS  aluminum hydroxide/magnesium hydroxide/simethicone Suspension 30 milliLiter(s) Oral every 4 hours PRN  ondansetron    Tablet 4 milliGRAM(s) Oral three times a day PRN  polyethylene glycol 3350 17 Gram(s) Oral two times a day PRN  senna 2 Tablet(s) Oral at bedtime PRN                                            ------------------------------------------------------------  VITAL SIGNS: Last 24 Hours  T(C): 36.4 (23 Aug 2023 13:10), Max: 36.8 (22 Aug 2023 13:43)  T(F): 97.6 (23 Aug 2023 13:10), Max: 98.2 (22 Aug 2023 13:43)  HR: 95 (23 Aug 2023 13:10) (86 - 95)  BP: 160/72 (23 Aug 2023 13:10) (133/64 - 177/79)  BP(mean): --  RR: 18 (23 Aug 2023 13:10) (18 - 18)  SpO2: 97% (23 Aug 2023 13:10) (97% - 100%)      08-22-23 @ 07:01  -  08-23-23 @ 07:00  --------------------------------------------------------  IN: 450 mL / OUT: 0 mL / NET: 450 mL                                             --------------------------------------------------------------  LABS:    08-23    142  |  102  |  17  ----------------------------<  109<H>  3.8   |  26  |  1.4    Ca    8.8      23 Aug 2023 07:03  Mg     2.4     08-23        Urinalysis Basic - ( 23 Aug 2023 07:03 )    Color: x / Appearance: x / SG: x / pH: x  Gluc: 109 mg/dL / Ketone: x  / Bili: x / Urobili: x   Blood: x / Protein: x / Nitrite: x   Leuk Esterase: x / RBC: x / WBC x   Sq Epi: x / Non Sq Epi: x / Bacteria: x                                                            -------------------------------------------------------------  RADIOLOGY:                                            --------------------------------------------------------------    PHYSICAL EXAM:  GENERAL: comfortable, in NAD   HEENT: NCAT  LUNGS: CTAB, Good air entry bilaterally   HEART: RRR, +S1,S2  ABDOMEN: NTTP, ND x 4 q's  EXT: Warm, well perfused x 4, +1 edema in bilateral legs  NEURO: AxOx3, No FND's noted  SKIN: No new breakdown or rashes noted                                                                                       MIKEL PEDROZA 82y Female  MRN#: [id number]   CODE STATUS: FULL    Hospital Day: 5d    Pt is currently admitted with the primary diagnosis of dizziness and giddiness    SUBJECTIVE    There were no overnight events. Pt was seen and examined at bedside today. Pt states that she still has lightheadedness, nausea, and an "aching" pain in her epigastric region; however, it is much improved since admission. She also stated that she has been feeling weakness in her legs from not walking around since admission; PT was consulted: she is able to ambulate without a need for assistive devices. They recommended home PT vs. outpt PT. Later on in the morning, after she was seen by PT, pt was on the phone, tearful, stating that she felt a tight sensation in her left leg. She denies having any pain, only tension. On physical examination, there was no redness, but there was slight edema (+1) in both lower extremities. The pt seems to be more likely having a muscle cramp than a DVT in her left leg.    In terms of meals, the pt stated that she has only been having only a few bites of food because of the nausea and not liking the food at the hospital. When asked if she would be able to eat more food if she were outside the hospital, the pt said she would be able to. The pt was encouraged to increase her oral intake to help alleviate the lightheadedness and "aching" in her abdomen; the "aching" sensation is most likely due to heartburn. Pt was counseled on the importance of maintaining a low salt diet and being compliant with her BP medications to keep her BP under control.    The pt explained that she hasn't had a bowel movement since yesterday. The pt was counseled that it would be fine and that she can ask the nurse for Miralax and senna as they would be provided as needed instead of being given to her every day as she was having diarrhea prior to them being no longer given every day.    The pt explained that she no longer felt safe being home by herself after everything that happened to her in the hospital. She wants to live with her son, but he is moving to a "[employer] 8" place that won't allow her to live with him. She plans to stay with her son for a short while after she is discharged from the hospital but wants to know if anything can be done to help her because she does not want to live alone anymore. When the pt was asked if she would be able to go to the doctor's office, buy groceries, etc., by herself when she gets discharged from the hospital, she stated that she could but she is too scared to. Case Management and Social Work referrals were placed.      Present Today:   - Anai:  No [ X ], Yes [   ] : Indication:     - Type of IV Access:       .. CVC/Piccline:  No [ X ], Yes [   ] : Indication:       .. Midline: No [ X ], Yes [   ] : Indication:                                             ----------------------------------------------------------  OBJECTIVE  PAST MEDICAL & SURGICAL HISTORY                                            -----------------------------------------------------------  ALLERGIES:  Allergy Status Unknown                                            ------------------------------------------------------------    HOME MEDICATIONS  Home Medications:                           MEDICATIONS:  STANDING MEDICATIONS  heparin   Injectable 5000 Unit(s) SubCutaneous every 8 hours  losartan 50 milliGRAM(s) Oral daily  NIFEdipine XL 90 milliGRAM(s) Oral at bedtime  pantoprazole    Tablet 40 milliGRAM(s) Oral two times a day    PRN MEDICATIONS  aluminum hydroxide/magnesium hydroxide/simethicone Suspension 30 milliLiter(s) Oral every 4 hours PRN  ondansetron    Tablet 4 milliGRAM(s) Oral three times a day PRN  polyethylene glycol 3350 17 Gram(s) Oral two times a day PRN  senna 2 Tablet(s) Oral at bedtime PRN                                            ------------------------------------------------------------  VITAL SIGNS: Last 24 Hours  T(C): 36.4 (23 Aug 2023 13:10), Max: 36.8 (22 Aug 2023 13:43)  T(F): 97.6 (23 Aug 2023 13:10), Max: 98.2 (22 Aug 2023 13:43)  HR: 95 (23 Aug 2023 13:10) (86 - 95)  BP: 160/72 (23 Aug 2023 13:10) (133/64 - 177/79)  BP(mean): --  RR: 18 (23 Aug 2023 13:10) (18 - 18)  SpO2: 97% (23 Aug 2023 13:10) (97% - 100%)      08-22-23 @ 07:01  -  08-23-23 @ 07:00  --------------------------------------------------------  IN: 450 mL / OUT: 0 mL / NET: 450 mL                                             --------------------------------------------------------------  LABS:    08-23    142  |  102  |  17  ----------------------------<  109<H>  3.8   |  26  |  1.4    Ca    8.8      23 Aug 2023 07:03  Mg     2.4     08-23        Urinalysis Basic - ( 23 Aug 2023 07:03 )    Color: x / Appearance: x / SG: x / pH: x  Gluc: 109 mg/dL / Ketone: x  / Bili: x / Urobili: x   Blood: x / Protein: x / Nitrite: x   Leuk Esterase: x / RBC: x / WBC x   Sq Epi: x / Non Sq Epi: x / Bacteria: x                                                            -------------------------------------------------------------  RADIOLOGY:  ACC: [id number] EXAM:  MR BRAIN   ORDERED BY: LUNA MARRERO     PROCEDURE DATE:  08/21/2023          INTERPRETATION:  Clinical History / Reason for exam: Dizziness.    MRI OF THE BRAIN WITHOUT CONTRAST    TECHNIQUE:    Multiplanar multisequence imaging of the brain was performed.    COMPARISON:    Noncontrast CT scan of the brain dated August 18, 2023.    FINDINGS:    The third and lateral ventricles are mildly enlarged as are the cortical   sulci consistent with a mild degree of cortical atrophy. The fourth   ventricle is normal in size and position.    There is no shift of the midline structures.    Mild thinning of the body of the corpus callosum consistent with mild   corpus callosal atrophy.    On the T2 and FLAIR images there are patchy and punctate foci of   hyperintense signal intensity in the periventricular white matter,   subcortical white matter, and brainstem likely representing chronic   ischemic change.    No MRI evidence to suggest acute ischemic change.    There is a partially empty sella.    Mucosal thickening in the sphenoid sinuses.    IMPRESSION:    1.  No MRI evidence to suggest acute ischemic change.    2.  Cerebral and corpus callosal atrophy.    3.  T2/FLAIR hyperintensities in the periventricular white matter,   subcortical white matter, and brainstem likely representing chronic   ischemic change.      --- End of Report ---    ACC: [id number] EXAM:  CT ANGIO NECK (W)AW IC   ORDERED BY: VARSHA WADSWORTH     ACC: [id number] EXAM:  CT ANGIO BRAIN (W)AW IC   ORDERED BY: VARSHA WADSWORTH     PROCEDURE DATE:  08/18/2023          INTERPRETATION:  CLINICAL HISTORY / REASON FOREXAM: dizziness    TECHNIQUE: CTA of the head and neck was obtained following the   intravenous administration intravenous contrast. Sagittal, coronal and   axial reformatted images were obtained as well as 3-D volume rendered   images.    CONTRAST VOLUME: Omnipaque 350 IV contrast. 100 cc administered. 0 cc   discarded.    COMPARISON: None.    FINDINGS:    CTA Neck:  The visualized aortic arch and great vessel origins demonstrate calcific   plaque without significant stenosis. 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 There is a direct origin of the   left vertebral artery from the aortic arch, normal variant.    The common, internal and external carotid arteries are patent.    The vertebral arteries are patent.    CTA Head:  The distal internal carotid arteries, middle cerebral arteries and   anterior cerebral arteries are unremarkable without significant stenosis.    The basilar artery is patent without significant stenosis. The proximal   branch vasculature of the posterior circulation is within normal limits.   The right posterior cerebral artery is patent without significant   stenosis. 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    The bilateral intracranial vertebral arteries are unremarkable without   significant stenosis.    No evidence of dural venous sinus thrombosis.    Other:  Degenerative changes of the spine.  Bilateral thyroid nodules measuring up to 1.4 cm in the right thyroid   lobe.  Severe degenerative changes of the temporomandibular joints.    IMPRESSION:    1.  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    2.  No other significant stenosis demonstrated.    3.  Bilateral thyroid nodules measuring up to 1.4 cm on the right.   Further evaluation withthyroid sonogram may be obtained on an   outpatient/elective basis.    --- End of Report ---    ACC: [id number] EXAM:  CT BRAIN   ORDERED BY: VARSHA WADSWORTH     PROCEDURE DATE:  08/18/2023          INTERPRETATION:  CLINICAL INDICATION: Dizziness    TECHNIQUE: CT of the head was performed without the administration of   intravenous contrast. Coronal and sagittal reformats were obtained.    COMPARISON: None available.    FINDINGS:    Age-appropriate sulci, sylvian fissures, and ventricles.    There are scattered patchy low attenuations in the periventricular   cerebral white matter, reflecting mild chronic microvascular ischemic   changes.    There is no acute territorial infarct, intracranial hemorrhage, mass   effect, or midline shift.    No evidence of hydrocephalus. No extra-axial fluid collections. Right   basal ganglia calcification.    The visualized intraorbital contents are unremarkable. Mild mucosal   plantar thickening of the bilateral sphenoid sinuses. The mastoid air   cells are aerated.    No acute depressed calvarial fracture.    IMPRESSION:    No evidence for acute intracranial pathology.    --- End of Report ---    ACC: [id number] EXAM:  XR CHEST PORTABLE URGENT 1V   ORDERED BY: VARSHA WADSWORTH     PROCEDURE DATE:  08/18/2023          INTERPRETATION:  Clinical History / Reason for exam: Chest pain.    Comparison : None.    Technique/Positioning: Single frontal chest x-ray obtained.    Findings:    Support devices: None.    Cardiac/mediastinum/hilum: Unremarkable.    Lung parenchyma/Pleura: Within normal limits.    Skeleton/soft tissues: Calcified breast prostheses. Degenerative change,   thoracic spine    Impression:    No radiographic evidence of acute cardiopulmonary disease.        --- End of Report ---                                            --------------------------------------------------------------    PHYSICAL EXAM:  GENERAL: comfortable, in NAD   HEENT: NCAT  LUNGS: CTAB, Good air entry bilaterally   HEART: RRR, +S1,S2  ABDOMEN: NTTP, ND x 4 q's  EXT: Warm, well perfused x 4, +1 edema in bilateral legs  NEURO: AxOx3, No FND's noted  SKIN: No new breakdown or rashes noted

## 2023-08-24 LAB
ALBUMIN SERPL ELPH-MCNC: 4.1 G/DL — SIGNIFICANT CHANGE UP (ref 3.5–5.2)
ALP SERPL-CCNC: 57 U/L — SIGNIFICANT CHANGE UP (ref 30–115)
ALT FLD-CCNC: 105 U/L — HIGH (ref 0–41)
ANION GAP SERPL CALC-SCNC: 14 MMOL/L — SIGNIFICANT CHANGE UP (ref 7–14)
AST SERPL-CCNC: 121 U/L — HIGH (ref 0–41)
BILIRUB SERPL-MCNC: 0.6 MG/DL — SIGNIFICANT CHANGE UP (ref 0.2–1.2)
BUN SERPL-MCNC: 18 MG/DL — SIGNIFICANT CHANGE UP (ref 10–20)
CALCIUM SERPL-MCNC: 9.2 MG/DL — SIGNIFICANT CHANGE UP (ref 8.4–10.4)
CHLORIDE SERPL-SCNC: 101 MMOL/L — SIGNIFICANT CHANGE UP (ref 98–110)
CO2 SERPL-SCNC: 26 MMOL/L — SIGNIFICANT CHANGE UP (ref 17–32)
CREAT SERPL-MCNC: 1.5 MG/DL — SIGNIFICANT CHANGE UP (ref 0.7–1.5)
EGFR: 35 ML/MIN/1.73M2 — LOW
GLUCOSE SERPL-MCNC: 119 MG/DL — HIGH (ref 70–99)
MAGNESIUM SERPL-MCNC: 2.4 MG/DL — SIGNIFICANT CHANGE UP (ref 1.8–2.4)
POTASSIUM SERPL-MCNC: 3.8 MMOL/L — SIGNIFICANT CHANGE UP (ref 3.5–5)
POTASSIUM SERPL-SCNC: 3.8 MMOL/L — SIGNIFICANT CHANGE UP (ref 3.5–5)
PROT SERPL-MCNC: 6.8 G/DL — SIGNIFICANT CHANGE UP (ref 6–8)
SODIUM SERPL-SCNC: 141 MMOL/L — SIGNIFICANT CHANGE UP (ref 135–146)

## 2023-08-24 PROCEDURE — 76705 ECHO EXAM OF ABDOMEN: CPT | Mod: 26

## 2023-08-24 PROCEDURE — 99232 SBSQ HOSP IP/OBS MODERATE 35: CPT

## 2023-08-24 RX ORDER — METOPROLOL TARTRATE 50 MG
25 TABLET ORAL DAILY
Refills: 0 | Status: DISCONTINUED | OUTPATIENT
Start: 2023-08-24 | End: 2023-08-25

## 2023-08-24 RX ADMIN — ONDANSETRON 4 MILLIGRAM(S): 8 TABLET, FILM COATED ORAL at 02:11

## 2023-08-24 RX ADMIN — HEPARIN SODIUM 5000 UNIT(S): 5000 INJECTION INTRAVENOUS; SUBCUTANEOUS at 05:36

## 2023-08-24 RX ADMIN — LOSARTAN POTASSIUM 50 MILLIGRAM(S): 100 TABLET, FILM COATED ORAL at 05:35

## 2023-08-24 RX ADMIN — Medication 25 MILLIGRAM(S): at 15:21

## 2023-08-24 RX ADMIN — HEPARIN SODIUM 5000 UNIT(S): 5000 INJECTION INTRAVENOUS; SUBCUTANEOUS at 15:17

## 2023-08-24 RX ADMIN — PANTOPRAZOLE SODIUM 40 MILLIGRAM(S): 20 TABLET, DELAYED RELEASE ORAL at 05:35

## 2023-08-24 RX ADMIN — HEPARIN SODIUM 5000 UNIT(S): 5000 INJECTION INTRAVENOUS; SUBCUTANEOUS at 21:19

## 2023-08-24 RX ADMIN — Medication 90 MILLIGRAM(S): at 21:19

## 2023-08-24 RX ADMIN — PANTOPRAZOLE SODIUM 40 MILLIGRAM(S): 20 TABLET, DELAYED RELEASE ORAL at 17:18

## 2023-08-24 RX ADMIN — Medication 30 MILLILITER(S): at 05:36

## 2023-08-24 RX ADMIN — POLYETHYLENE GLYCOL 3350 17 GRAM(S): 17 POWDER, FOR SOLUTION ORAL at 11:15

## 2023-08-24 NOTE — MEDICAL STUDENT PROGRESS NOTE(EDUCATION) - SUBJECTIVE AND OBJECTIVE BOX
MIKEL PEDROZA 82y Female  MRN#: [id number]   CODE STATUS: FULL    Hospital Day: 6d    Pt is currently admitted with the primary diagnosis of     SUBJECTIVE    Hospital Course    Overnight events    Subjective complaints    Present Today:   - Anai:  No [  ], Yes [   ] : Indication:     - Type of IV Access:       .. CVC/Piccline:  No [  ], Yes [   ] : Indication:       .. Midline: No [  ], Yes [   ] : Indication:                                             ----------------------------------------------------------  OBJECTIVE  PAST MEDICAL & SURGICAL HISTORY                                            -----------------------------------------------------------  ALLERGIES:  Allergy Status Unknown                                            ------------------------------------------------------------    HOME MEDICATIONS  Home Medications:                           MEDICATIONS:  STANDING MEDICATIONS  heparin   Injectable 5000 Unit(s) SubCutaneous every 8 hours  NIFEdipine XL 90 milliGRAM(s) Oral at bedtime  pantoprazole    Tablet 40 milliGRAM(s) Oral two times a day    PRN MEDICATIONS  aluminum hydroxide/magnesium hydroxide/simethicone Suspension 30 milliLiter(s) Oral every 4 hours PRN  ondansetron    Tablet 4 milliGRAM(s) Oral three times a day PRN  polyethylene glycol 3350 17 Gram(s) Oral two times a day PRN  senna 2 Tablet(s) Oral at bedtime PRN                                            ------------------------------------------------------------  VITAL SIGNS: Last 24 Hours  T(C): 36.3 (24 Aug 2023 05:15), Max: 36.4 (23 Aug 2023 13:10)  T(F): 97.4 (24 Aug 2023 05:15), Max: 97.6 (23 Aug 2023 13:10)  HR: 84 (24 Aug 2023 05:15) (84 - 95)  BP: 141/71 (24 Aug 2023 05:15) (141/71 - 160/72)  BP(mean): --  RR: 18 (24 Aug 2023 05:15) (18 - 18)  SpO2: 97% (23 Aug 2023 13:10) (97% - 97%)                                             --------------------------------------------------------------  LABS:    08-24    141  |  101  |  18  ----------------------------<  119<H>  3.8   |  26  |  1.5    Ca    9.2      24 Aug 2023 07:58  Mg     2.4     08-24    TPro  6.8  /  Alb  4.1  /  TBili  0.6  /  DBili  x   /  AST  121<H>  /  ALT  105<H>  /  AlkPhos  57  08-24      Urinalysis Basic - ( 24 Aug 2023 07:58 )    Color: x / Appearance: x / SG: x / pH: x  Gluc: 119 mg/dL / Ketone: x  / Bili: x / Urobili: x   Blood: x / Protein: x / Nitrite: x   Leuk Esterase: x / RBC: x / WBC x   Sq Epi: x / Non Sq Epi: x / Bacteria: x                                                            -------------------------------------------------------------  RADIOLOGY:                                            --------------------------------------------------------------    PHYSICAL EXAM:  GENERAL:  comfortable, in NAD   HEENT: NCAT  LUNGS: CTAB, Good air entry bilaterally   HEART: RRR, +S1,S2, RRR  ABDOMEN: SNTTP, ND x 4 q's  EXT: Warm, well perfused x 4  NEURO: AxOx3, No FND's noted  SKIN: No new breakdown or rashes noted                                                                                                                    ----------------------------------------------------  # DVT prophylaxis   # GI prophylaxis   # Diet   # Activity Score (AM-PAC)  # Code status   # Disposition                                                                            --------------------------------------------------------  # Handoff      MIKEL PEDROZA 82y Female  MRN#: [id number]   CODE STATUS: FULL    Hospital Day: 6d    Pt is currently admitted with the primary diagnosis of dizziness and giddiness    SUBJECTIVE    There were no overnight events. Pt was seen and examined at bedside today. Pt stated that she felt alright but wanted to go home. She continued to feel nausea, lightheadedness, and a heartburn sensation; she was then given Zofran and Maalox this morning. At the time of the examination, the pt had not eaten since dinner around 5PM and had begun feeling the nausea during the night. The pt was encouraged by the team to eat more during meals as the pt endorsed eating only a few bites of food and jello during every meal because she didn't like the food offered by the hospital.     She also continued to feel weakness in her LEs and a tight sensation in her L leg but stated that the sensation goes away when she walks. On physical examination, there was no redness or tenderness, but there is slight (+1) pitting edema in her bilateral LEs. The tight sensation in her RLE is most likely due to a muscle cramp. She was seen by PT yesterday who evaluated that she is able to ambulate without needing any assistive devices. She was encouraged by the team to walk around more as the weakness in her legs was most likely from staying in bed since admission.    Pt endorsed never seeing any doctor and not taking any medications as well. She stated that she might have taken a medication, a "tiny white pill," in 2019 but has not been going to a pharmacy. [facility] was contacted ([phone]) as the pt stated that she might have gotten medications from there before; they have no records of the pt in their system.    Present Today:   - Ospina:  No [ X ], Yes [   ] : Indication:     - Type of IV Access:       .. CVC/Piccline:  No [ X ], Yes [   ] : Indication:       .. Midline: No [ X ], Yes [   ] : Indication:                                             ----------------------------------------------------------  OBJECTIVE  PAST MEDICAL & SURGICAL HISTORY                                            -----------------------------------------------------------  ALLERGIES:  Allergy Status Unknown                                            ------------------------------------------------------------    HOME MEDICATIONS  Home Medications:                           MEDICATIONS:  STANDING MEDICATIONS  heparin   Injectable 5000 Unit(s) SubCutaneous every 8 hours  NIFEdipine XL 90 milliGRAM(s) Oral at bedtime  pantoprazole    Tablet 40 milliGRAM(s) Oral two times a day    PRN MEDICATIONS  aluminum hydroxide/magnesium hydroxide/simethicone Suspension 30 milliLiter(s) Oral every 4 hours PRN  ondansetron    Tablet 4 milliGRAM(s) Oral three times a day PRN  polyethylene glycol 3350 17 Gram(s) Oral two times a day PRN  senna 2 Tablet(s) Oral at bedtime PRN                                            ------------------------------------------------------------  VITAL SIGNS: Last 24 Hours  T(C): 36.3 (24 Aug 2023 05:15), Max: 36.4 (23 Aug 2023 13:10)  T(F): 97.4 (24 Aug 2023 05:15), Max: 97.6 (23 Aug 2023 13:10)  HR: 84 (24 Aug 2023 05:15) (84 - 95)  BP: 141/71 (24 Aug 2023 05:15) (141/71 - 160/72)  BP(mean): --  RR: 18 (24 Aug 2023 05:15) (18 - 18)  SpO2: 97% (23 Aug 2023 13:10) (97% - 97%)                                             --------------------------------------------------------------  LABS:    08-24    141  |  101  |  18  ----------------------------<  119<H>  3.8   |  26  |  1.5    Ca    9.2      24 Aug 2023 07:58  Mg     2.4     08-24    TPro  6.8  /  Alb  4.1  /  TBili  0.6  /  DBili  x   /  AST  121<H>  /  ALT  105<H>  /  AlkPhos  57  08-24      Urinalysis Basic - ( 24 Aug 2023 07:58 )    Color: x / Appearance: x / SG: x / pH: x  Gluc: 119 mg/dL / Ketone: x  / Bili: x / Urobili: x   Blood: x / Protein: x / Nitrite: x   Leuk Esterase: x / RBC: x / WBC x   Sq Epi: x / Non Sq Epi: x / Bacteria: x                                                            -------------------------------------------------------------  RADIOLOGY:  ACC: [id number] EXAM:  MR BRAIN   ORDERED BY: ULNA MARRERO     PROCEDURE DATE:  08/21/2023          INTERPRETATION:  Clinical History / Reason for exam: Dizziness.    MRI OF THE BRAIN WITHOUT CONTRAST    TECHNIQUE:    Multiplanar multisequence imaging of the brain was performed.    COMPARISON:    Noncontrast CT scan of the brain dated August 18, 2023.    FINDINGS:    The third and lateral ventricles are mildly enlarged as are the cortical   sulci consistent with a mild degree of cortical atrophy. The fourth   ventricle is normal in size and position.    There is no shift of the midline structures.    Mild thinning of the body of the corpus callosum consistent with mild   corpus callosal atrophy.    On the T2 and FLAIR images there are patchy and punctate foci of   hyperintense signal intensity in the periventricular white matter,   subcortical white matter, and brainstem likely representing chronic   ischemic change.    No MRI evidence to suggest acute ischemic change.    There is a partially empty sella.    Mucosal thickening in the sphenoid sinuses.    IMPRESSION:    1.  No MRI evidence to suggest acute ischemic change.    2.  Cerebral and corpus callosal atrophy.    3.  T2/FLAIR hyperintensities in the periventricular white matter,   subcortical white matter, and brainstem likely representing chronic   ischemic change.      --- End of Report ---    ACC: [id number] EXAM:  CT ANGIO NECK (W)AW IC   ORDERED BY: VARSHA WADSWORTH     ACC: [id number] EXAM:  CT ANGIO BRAIN (W)AW IC   ORDERED BY: VARSHA WADSWORTH     PROCEDURE DATE:  08/18/2023          INTERPRETATION:  CLINICAL HISTORY / REASON FOREXAM: dizziness    TECHNIQUE: CTA of the head and neck was obtained following the   intravenous administration intravenous contrast. Sagittal, coronal and   axial reformatted images were obtained as well as 3-D volume rendered   images.    CONTRAST VOLUME: Omnipaque 350 IV contrast. 100 cc administered. 0 cc   discarded.    COMPARISON: None.    FINDINGS:    CTA Neck:  The visualized aortic arch and great vessel origins demonstrate calcific   plaque without significant stenosis. 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 There is a direct origin of the   left vertebral artery from the aortic arch, normal variant.    The common, internal and external carotid arteries are patent.    The vertebral arteries are patent.    CTA Head:  The distal internal carotid arteries, middle cerebral arteries and   anterior cerebral arteries are unremarkable without significant stenosis.    The basilar artery is patent without significant stenosis. The proximal   branch vasculature of the posterior circulation is within normal limits.   The right posterior cerebral artery is patent without significant   stenosis. 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    The bilateral intracranial vertebral arteries are unremarkable without   significant stenosis.    No evidence of dural venous sinus thrombosis.    Other:  Degenerative changes of the spine.  Bilateral thyroid nodules measuring up to 1.4 cm in the right thyroid   lobe.  Severe degenerative changes of the temporomandibular joints.    IMPRESSION:    1.  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    2.  No other significant stenosis demonstrated.    3.  Bilateral thyroid nodules measuring up to 1.4 cm on the right.   Further evaluation withthyroid sonogram may be obtained on an   outpatient/elective basis.    --- End of Report ---    ACC: [id number] EXAM:  CT BRAIN   ORDERED BY: VARSHA WADSWORTH     PROCEDURE DATE:  08/18/2023          INTERPRETATION:  CLINICAL INDICATION: Dizziness    TECHNIQUE: CT of the head was performed without the administration of   intravenous contrast. Coronal and sagittal reformats were obtained.    COMPARISON: None available.    FINDINGS:    Age-appropriate sulci, sylvian fissures, and ventricles.    There are scattered patchy low attenuations in the periventricular   cerebral white matter, reflecting mild chronic microvascular ischemic   changes.    There is no acute territorial infarct, intracranial hemorrhage, mass   effect, or midline shift.    No evidence of hydrocephalus. No extra-axial fluid collections. Right   basal ganglia calcification.    The visualized intraorbital contents are unremarkable. Mild mucosal   plantar thickening of the bilateral sphenoid sinuses. The mastoid air   cells are aerated.    No acute depressed calvarial fracture.    IMPRESSION:    No evidence for acute intracranial pathology.    --- End of Report ---    ACC: [id number] EXAM:  XR CHEST PORTABLE URGENT 1V   ORDERED BY: VARSHA WADSWORTH     PROCEDURE DATE:  08/18/2023          INTERPRETATION:  Clinical History / Reason for exam: Chest pain.    Comparison : None.    Technique/Positioning: Single frontal chest x-ray obtained.    Findings:    Support devices: None.    Cardiac/mediastinum/hilum: Unremarkable.    Lung parenchyma/Pleura: Within normal limits.    Skeleton/soft tissues: Calcified breast prostheses. Degenerative change,   thoracic spine    Impression:    No radiographic evidence of acute cardiopulmonary disease.        --- End of Report ---                                            --------------------------------------------------------------    PHYSICAL EXAM:  GENERAL: comfortable, in NAD   HEENT: NCAT  LUNGS: CTAB, Good air entry bilaterally   HEART: RRR, +S1,S2  ABDOMEN: NTTP, ND x 4 q's  EXT: Warm, well perfused x 4, + pitting edema in bilateral LEs  NEURO: AxOx3, No FND's noted  SKIN: No new breakdown or rashes noted                                             MIKEL PEDROZA 82y Female  MRN#: [id number]   CODE STATUS: FULL    Hospital Day: 6d    Pt is currently admitted with the primary diagnosis of dizziness and giddiness    SUBJECTIVE    There were no overnight events. Pt was seen and examined at bedside today. Pt stated that she felt alright but wanted to go home. She continued to feel nausea, lightheadedness, and a heartburn sensation; she was then given Zofran and Maalox this morning. At the time of the examination, the pt had not eaten since dinner around 5PM and had begun feeling the nausea during the night. The pt was encouraged by the team to eat more during meals as the pt endorsed eating only a few bites of food and jello during every meal because she didn't like the food offered by the hospital.     She also continued to feel weakness in her LEs and a tight sensation in her L leg but stated that the sensation goes away when she walks. On physical examination, there was no redness or tenderness, but there is slight (+1) pitting edema in her bilateral LEs. The tight sensation in her RLE is most likely due to a muscle cramp. She was seen by PT yesterday who evaluated that she is able to ambulate without needing any assistive devices. She was encouraged by the team to walk around more as the weakness in her legs was most likely from staying in bed since admission.    Pt endorsed that she might have taken a medication, a "tiny white pill," in 2019, which she stopped taking after a month, after being told that she had HTN but has not been going to a pharmacy. 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 [email or web address] was contacted ([phone]) as the pt stated that she might have gotten medications from there before; they have no records of the pt in their system.      Present Today:   - Ospina:  No [ X ], Yes [   ] : Indication:     - Type of IV Access:       .. CVC/Piccline:  No [ X ], Yes [   ] : Indication:       .. Midline: No [ X ], Yes [   ] : Indication:                                             ----------------------------------------------------------  OBJECTIVE  PAST MEDICAL & SURGICAL HISTORY                                            -----------------------------------------------------------  ALLERGIES:  Allergy Status Unknown                                            ------------------------------------------------------------    HOME MEDICATIONS  Home Medications:                           MEDICATIONS:  STANDING MEDICATIONS  heparin   Injectable 5000 Unit(s) SubCutaneous every 8 hours  NIFEdipine XL 90 milliGRAM(s) Oral at bedtime  pantoprazole    Tablet 40 milliGRAM(s) Oral two times a day    PRN MEDICATIONS  aluminum hydroxide/magnesium hydroxide/simethicone Suspension 30 milliLiter(s) Oral every 4 hours PRN  ondansetron    Tablet 4 milliGRAM(s) Oral three times a day PRN  polyethylene glycol 3350 17 Gram(s) Oral two times a day PRN  senna 2 Tablet(s) Oral at bedtime PRN                                            ------------------------------------------------------------  VITAL SIGNS: Last 24 Hours  T(C): 36.3 (24 Aug 2023 05:15), Max: 36.4 (23 Aug 2023 13:10)  T(F): 97.4 (24 Aug 2023 05:15), Max: 97.6 (23 Aug 2023 13:10)  HR: 84 (24 Aug 2023 05:15) (84 - 95)  BP: 141/71 (24 Aug 2023 05:15) (141/71 - 160/72)  BP(mean): --  RR: 18 (24 Aug 2023 05:15) (18 - 18)  SpO2: 97% (23 Aug 2023 13:10) (97% - 97%)                                             --------------------------------------------------------------  LABS:    08-24    141  |  101  |  18  ----------------------------<  119<H>  3.8   |  26  |  1.5    Ca    9.2      24 Aug 2023 07:58  Mg     2.4     08-24    TPro  6.8  /  Alb  4.1  /  TBili  0.6  /  DBili  x   /  AST  121<H>  /  ALT  105<H>  /  AlkPhos  57  08-24      Urinalysis Basic - ( 24 Aug 2023 07:58 )    Color: x / Appearance: x / SG: x / pH: x  Gluc: 119 mg/dL / Ketone: x  / Bili: x / Urobili: x   Blood: x / Protein: x / Nitrite: x   Leuk Esterase: x / RBC: x / WBC x   Sq Epi: x / Non Sq Epi: x / Bacteria: x                                                            -------------------------------------------------------------  RADIOLOGY:  ACC: [id number] EXAM:  MR BRAIN   ORDERED BY: LUNA MARRERO     PROCEDURE DATE:  08/21/2023          INTERPRETATION:  Clinical History / Reason for exam: Dizziness.    MRI OF THE BRAIN WITHOUT CONTRAST    TECHNIQUE:    Multiplanar multisequence imaging of the brain was performed.    COMPARISON:    Noncontrast CT scan of the brain dated August 18, 2023.    FINDINGS:    The third and lateral ventricles are mildly enlarged as are the cortical   sulci consistent with a mild degree of cortical atrophy. The fourth   ventricle is normal in size and position.    There is no shift of the midline structures.    Mild thinning of the body of the corpus callosum consistent with mild   corpus callosal atrophy.    On the T2 and FLAIR images there are patchy and punctate foci of   hyperintense signal intensity in the periventricular white matter,   subcortical white matter, and brainstem likely representing chronic   ischemic change.    No MRI evidence to suggest acute ischemic change.    There is a partially empty sella.    Mucosal thickening in the sphenoid sinuses.    IMPRESSION:    1.  No MRI evidence to suggest acute ischemic change.    2.  Cerebral and corpus callosal atrophy.    3.  T2/FLAIR hyperintensities in the periventricular white matter,   subcortical white matter, and brainstem likely representing chronic   ischemic change.      --- End of Report ---    ACC: [id number] EXAM:  CT ANGIO NECK (W)AW IC   ORDERED BY: VARSHA WADSWORTH     ACC: [id number] EXAM:  CT ANGIO BRAIN (W)AW IC   ORDERED BY: VARSHA WADSWORTH     PROCEDURE DATE:  08/18/2023          INTERPRETATION:  CLINICAL HISTORY / REASON FOREXAM: dizziness    TECHNIQUE: CTA of the head and neck was obtained following the   intravenous administration intravenous contrast. Sagittal, coronal and   axial reformatted images were obtained as well as 3-D volume rendered   images.    CONTRAST VOLUME: Omnipaque 350 IV contrast. 100 cc administered. 0 cc   discarded.    COMPARISON: None.    FINDINGS:    CTA Neck:  The visualized aortic arch and great vessel origins demonstrate calcific   plaque without significant stenosis. 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 There is a direct origin of the   left vertebral artery from the aortic arch, normal variant.    The common, internal and external carotid arteries are patent.    The vertebral arteries are patent.    CTA Head:  The distal internal carotid arteries, middle cerebral arteries and   anterior cerebral arteries are unremarkable without significant stenosis.    The basilar artery is patent without significant stenosis. The proximal   branch vasculature of the posterior circulation is within normal limits.   The right posterior cerebral artery is patent without significant   stenosis. 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    The bilateral intracranial vertebral arteries are unremarkable without   significant stenosis.    No evidence of dural venous sinus thrombosis.    Other:  Degenerative changes of the spine.  Bilateral thyroid nodules measuring up to 1.4 cm in the right thyroid   lobe.  Severe degenerative changes of the temporomandibular joints.    IMPRESSION:    1.  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    2.  No other significant stenosis demonstrated.    3.  Bilateral thyroid nodules measuring up to 1.4 cm on the right.   Further evaluation withthyroid sonogram may be obtained on an   outpatient/elective basis.    --- End of Report ---    ACC: [id number] EXAM:  CT BRAIN   ORDERED BY: VARSHA WADSWORTH     PROCEDURE DATE:  08/18/2023          INTERPRETATION:  CLINICAL INDICATION: Dizziness    TECHNIQUE: CT of the head was performed without the administration of   intravenous contrast. Coronal and sagittal reformats were obtained.    COMPARISON: None available.    FINDINGS:    Age-appropriate sulci, sylvian fissures, and ventricles.    There are scattered patchy low attenuations in the periventricular   cerebral white matter, reflecting mild chronic microvascular ischemic   changes.    There is no acute territorial infarct, intracranial hemorrhage, mass   effect, or midline shift.    No evidence of hydrocephalus. No extra-axial fluid collections. Right   basal ganglia calcification.    The visualized intraorbital contents are unremarkable. Mild mucosal   plantar thickening of the bilateral sphenoid sinuses. The mastoid air   cells are aerated.    No acute depressed calvarial fracture.    IMPRESSION:    No evidence for acute intracranial pathology.    --- End of Report ---    ACC: [id number] EXAM:  XR CHEST PORTABLE URGENT 1V   ORDERED BY: VARSHA WADSWORTH     PROCEDURE DATE:  08/18/2023          INTERPRETATION:  Clinical History / Reason for exam: Chest pain.    Comparison : None.    Technique/Positioning: Single frontal chest x-ray obtained.    Findings:    Support devices: None.    Cardiac/mediastinum/hilum: Unremarkable.    Lung parenchyma/Pleura: Within normal limits.    Skeleton/soft tissues: Calcified breast prostheses. Degenerative change,   thoracic spine    Impression:    No radiographic evidence of acute cardiopulmonary disease.        --- End of Report ---                                            --------------------------------------------------------------    PHYSICAL EXAM:  GENERAL: comfortable, in NAD   HEENT: NCAT  LUNGS: CTAB, Good air entry bilaterally   HEART: RRR, +S1,S2  ABDOMEN: NTTP, ND x 4 q's  EXT: Warm, well perfused x 4, + pitting edema in bilateral LEs  NEURO: AxOx3, No FND's noted  SKIN: No new breakdown or rashes noted

## 2023-08-24 NOTE — MEDICAL STUDENT PROGRESS NOTE(EDUCATION) - ASSESSMENT
81 y/o woman with PMH of HTN on garlic presented with lightheadedness and decreased po intake for the past 3 weeks. In the ED, she was found to have hypertensive urgency: 231/102.    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 DASH and low sodium- soft and bite-sized diet  - BP controlled but Cr uptrending to 1.5 (8/24) -> d/c losartan  - cont. nifedipine 90mg QD at bedtime  - will need close outpatient f/u for HTN -> has [facility] appt on 9/7 w/ Dr. Quijano    #Decreased PO intake due to nausea due to denture paste  - cont. Zofran PRN for nausea  - Pt advised to f/u with her own dentist for re-evaluation  - pt received Miralax and senna daily for constipation but then had diarrhea -> Miralax and senna now given PRN  - heartburn sensation - cont. Protonix 40mg PO BID    #Incidental bilateral Thyroid nodules  - CTA of head and neck (8/18) showed bilateral thyroid nodules measuring up to 1.4cm in R thyroid lobe  - TSH, fT4 lvls within normal range  - recommend outpt thyroid U/S                                                                # DVT prophylaxis: heparin 5000u SQ q8h  # GI prophylaxis: Protonix 40mg BID  # Diet: soft and bite-sized - DASH/TLC  # Activity Score (AM-PAC): no restrictions - home PT vs. outpt PT  # Code status: full  # Disposition: home 83 y/o woman with PMH of HTN on garlic presented with lightheadedness and decreased po intake for the past 3 weeks. In the ED, she was found to have hypertensive urgency: 231/102.    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 DASH and low sodium- soft and bite-sized diet  - BP controlled but Cr uptrending to 1.5 (8/24) -> d/c losartan  - cont. nifedipine 90mg QD at bedtime  - will need close outpatient f/u for HTN -> has [facility] appt on 9/7 w/ Dr. Quijano    #Decreased PO intake due to nausea due to denture paste  - cont. Zofran PRN for nausea  - Pt advised to f/u with her own dentist for re-evaluation  - pt received Miralax and senna daily for constipation but then had diarrhea -> Miralax and senna now given PRN  - heartburn sensation - cont. Protonix 40mg PO BID    #Incidental bilateral Thyroid nodules  - CTA of head and neck (8/18) showed bilateral thyroid nodules measuring up to 1.4cm in R thyroid lobe  - TSH, fT4 lvls within normal range  - recommend outpt thyroid U/S    #Elevated LFTs  - ,   - f/u RUQ u/s                                                                # DVT prophylaxis: heparin 5000u SQ q8h  # GI prophylaxis: Protonix 40mg BID  # Diet: soft and bite-sized - DASH/TLC  # Activity Score (AM-PAC): no restrictions - home PT vs. outpt PT  # Code status: full  # Disposition: home 81 y/o woman with PMH of HTN on garlic presented with lightheadedness and decreased po intake for the past 3 weeks. In the ED, she was found to have hypertensive urgency: 231/102.    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 DASH and low sodium- soft and bite-sized diet  - BP controlled but Cr uptrending to 1.5 (8/24) -> d/c losartan  - cont. nifedipine 90mg QD at bedtime  - will need close outpatient f/u for HTN -> has [facility] appt on 9/7 w/ Dr. Quijano    #Decreased PO intake due to nausea due to denture paste  - cont. Zofran PRN for nausea  - Pt advised to f/u with her own dentist for re-evaluation  - pt received Miralax and senna daily for constipation but then had diarrhea -> Miralax and senna now given PRN  - heartburn sensation - cont. Protonix 40mg PO BID    #Incidental bilateral Thyroid nodules  - CTA of head and neck (8/18) showed bilateral thyroid nodules measuring up to 1.4cm in R thyroid lobe  - TSH, fT4 lvls within normal range  - recommend outpt thyroid U/S    #Elevated LFTs  - ,   - f/u RUQ u/s                                                                # DVT prophylaxis: heparin 5000u SQ q8h  # GI prophylaxis: Protonix 40mg BID  # Diet: soft and bite-sized - DASH/TLC  # Activity Score (AM-PAC): no restrictions - home PT vs. outpt PT  # Code status: full    # Disposition: home 83 y/o woman with PMH of HTN on garlic presented with lightheadedness and decreased po intake for the past 3 weeks. In the ED, she was found to have hypertensive urgency: 231/102.    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 DASH and low sodium- soft and bite-sized diet  - BP controlled but Cr uptrending to 1.5 (8/24) -> d/c losartan  - cont. nifedipine 90mg QD at bedtime  - start metoprolol succinate 25mg QD  - will need close outpatient f/u for HTN -> has [facility] appt on 9/7 w/ Dr. Quijano    #Decreased PO intake due to nausea due to denture paste  - cont. Zofran PRN for nausea  - Pt advised to f/u with her own dentist for re-evaluation  - pt received Miralax and senna daily for constipation but then had diarrhea -> Miralax and senna now given PRN  - heartburn sensation - cont. Protonix 40mg PO BID    #Incidental bilateral Thyroid nodules  - CTA of head and neck (8/18) showed bilateral thyroid nodules measuring up to 1.4cm in R thyroid lobe  - TSH, fT4 lvls within normal range  - recommend outpt thyroid U/S    #Elevated LFTs  - ,   - f/u RUQ u/s                                                                # DVT prophylaxis: heparin 5000u SQ q8h  # GI prophylaxis: Protonix 40mg BID  # Diet: soft and bite-sized - DASH/TLC  # Activity Score (AM-PAC): no restrictions - home PT vs. outpt PT  # Code status: full    # Disposition: home

## 2023-08-24 NOTE — CHART NOTE - NSCHARTNOTEFT_GEN_A_CORE
Pt stated that she might have taken a medication, a "tiny white pill," in 2019 but doesn't remember what pharmacy she got the medication from; it was possibly from [facility]. Medical student Rizwana Solis contacted [facility] ([phone]) through phone. The pharmacist stated that there is no record of the pt in the system.

## 2023-08-24 NOTE — PROGRESS NOTE ADULT - ASSESSMENT
81 y/o woman with PMH of HTN on garlic presented with lightheadedness and decreased po intake for the past 3 weeks.      1. 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 losartan held due ot elevated serum creatinine and worsening GFR   follow repeat BMP   low sodium soft and bite sized diet  telemetry had no events   ECHO: EF 59%  negative orthostatics    2. Decreased PO intake with nausea  on zofran prn  pt stopped using denture paste and will f/u with her own dentist for re-evaluation  heartburn sensation - change PPI to q12hr and monitor    3. Thyroid nodules - outpt thyroid US   TSH and fT4 WNL    4. DVT prophylaxis - OOB and ambulate - on heparin SQ      full code        PROGRESS NOTE HANDOFF    Pending: monitor serum creatinine. monitor LFTs, follow RUQ US. monitor BP. may need to add another BP medication. possible discharge in 24 hours if clinically doing better   pt informed of the plan of care  Disposition: home with care in 24hrs if improves    [facility] appt: Sept 7 at 2:15PM with Dr. Quijano

## 2023-08-25 LAB
ALBUMIN SERPL ELPH-MCNC: 4 G/DL — SIGNIFICANT CHANGE UP (ref 3.5–5.2)
ALP SERPL-CCNC: 57 U/L — SIGNIFICANT CHANGE UP (ref 30–115)
ALT FLD-CCNC: 107 U/L — HIGH (ref 0–41)
ANION GAP SERPL CALC-SCNC: 15 MMOL/L — HIGH (ref 7–14)
AST SERPL-CCNC: 101 U/L — HIGH (ref 0–41)
BASOPHILS # BLD AUTO: 0.05 K/UL — SIGNIFICANT CHANGE UP (ref 0–0.2)
BASOPHILS NFR BLD AUTO: 0.6 % — SIGNIFICANT CHANGE UP (ref 0–1)
BILIRUB SERPL-MCNC: 0.5 MG/DL — SIGNIFICANT CHANGE UP (ref 0.2–1.2)
BUN SERPL-MCNC: 18 MG/DL — SIGNIFICANT CHANGE UP (ref 10–20)
CALCIUM SERPL-MCNC: 9.2 MG/DL — SIGNIFICANT CHANGE UP (ref 8.4–10.5)
CHLORIDE SERPL-SCNC: 102 MMOL/L — SIGNIFICANT CHANGE UP (ref 98–110)
CO2 SERPL-SCNC: 24 MMOL/L — SIGNIFICANT CHANGE UP (ref 17–32)
CREAT SERPL-MCNC: 1.4 MG/DL — SIGNIFICANT CHANGE UP (ref 0.7–1.5)
EGFR: 38 ML/MIN/1.73M2 — LOW
EOSINOPHIL # BLD AUTO: 0.13 K/UL — SIGNIFICANT CHANGE UP (ref 0–0.7)
EOSINOPHIL NFR BLD AUTO: 1.6 % — SIGNIFICANT CHANGE UP (ref 0–8)
GLUCOSE SERPL-MCNC: 124 MG/DL — HIGH (ref 70–99)
HCT VFR BLD CALC: 42.7 % — SIGNIFICANT CHANGE UP (ref 37–47)
HGB BLD-MCNC: 14.3 G/DL — SIGNIFICANT CHANGE UP (ref 12–16)
IMM GRANULOCYTES NFR BLD AUTO: 0.4 % — HIGH (ref 0.1–0.3)
LYMPHOCYTES # BLD AUTO: 2.33 K/UL — SIGNIFICANT CHANGE UP (ref 1.2–3.4)
LYMPHOCYTES # BLD AUTO: 28.5 % — SIGNIFICANT CHANGE UP (ref 20.5–51.1)
MAGNESIUM SERPL-MCNC: 2.5 MG/DL — HIGH (ref 1.8–2.4)
MCHC RBC-ENTMCNC: 30.8 PG — SIGNIFICANT CHANGE UP (ref 27–31)
MCHC RBC-ENTMCNC: 33.5 G/DL — SIGNIFICANT CHANGE UP (ref 32–37)
MCV RBC AUTO: 92 FL — SIGNIFICANT CHANGE UP (ref 81–99)
METANEPHRINE, PL: 27.5 PG/ML — SIGNIFICANT CHANGE UP (ref 0–88)
MONOCYTES # BLD AUTO: 0.72 K/UL — HIGH (ref 0.1–0.6)
MONOCYTES NFR BLD AUTO: 8.8 % — SIGNIFICANT CHANGE UP (ref 1.7–9.3)
NEUTROPHILS # BLD AUTO: 4.92 K/UL — SIGNIFICANT CHANGE UP (ref 1.4–6.5)
NEUTROPHILS NFR BLD AUTO: 60.1 % — SIGNIFICANT CHANGE UP (ref 42.2–75.2)
NORMETANEPHRINE, PL: 153.5 PG/ML — SIGNIFICANT CHANGE UP (ref 0–297.2)
NRBC # BLD: 0 /100 WBCS — SIGNIFICANT CHANGE UP (ref 0–0)
PLATELET # BLD AUTO: 308 K/UL — SIGNIFICANT CHANGE UP (ref 130–400)
PMV BLD: 10.3 FL — SIGNIFICANT CHANGE UP (ref 7.4–10.4)
POTASSIUM SERPL-MCNC: 4 MMOL/L — SIGNIFICANT CHANGE UP (ref 3.5–5)
POTASSIUM SERPL-SCNC: 4 MMOL/L — SIGNIFICANT CHANGE UP (ref 3.5–5)
PROT SERPL-MCNC: 6.5 G/DL — SIGNIFICANT CHANGE UP (ref 6–8)
RBC # BLD: 4.64 M/UL — SIGNIFICANT CHANGE UP (ref 4.2–5.4)
RBC # FLD: 12.8 % — SIGNIFICANT CHANGE UP (ref 11.5–14.5)
RENIN PLAS-CCNC: 1.04 NG/ML/HR — SIGNIFICANT CHANGE UP (ref 0.17–5.38)
SODIUM SERPL-SCNC: 141 MMOL/L — SIGNIFICANT CHANGE UP (ref 135–146)
WBC # BLD: 8.18 K/UL — SIGNIFICANT CHANGE UP (ref 4.8–10.8)
WBC # FLD AUTO: 8.18 K/UL — SIGNIFICANT CHANGE UP (ref 4.8–10.8)

## 2023-08-25 PROCEDURE — 99232 SBSQ HOSP IP/OBS MODERATE 35: CPT

## 2023-08-25 PROCEDURE — 76770 US EXAM ABDO BACK WALL COMP: CPT | Mod: 26

## 2023-08-25 RX ORDER — SODIUM CHLORIDE 9 MG/ML
1000 INJECTION, SOLUTION INTRAVENOUS
Refills: 0 | Status: DISCONTINUED | OUTPATIENT
Start: 2023-08-25 | End: 2023-08-26

## 2023-08-25 RX ADMIN — Medication 30 MILLILITER(S): at 07:24

## 2023-08-25 RX ADMIN — Medication 90 MILLIGRAM(S): at 21:45

## 2023-08-25 RX ADMIN — ONDANSETRON 4 MILLIGRAM(S): 8 TABLET, FILM COATED ORAL at 05:19

## 2023-08-25 RX ADMIN — HEPARIN SODIUM 5000 UNIT(S): 5000 INJECTION INTRAVENOUS; SUBCUTANEOUS at 05:20

## 2023-08-25 RX ADMIN — Medication 25 MILLIGRAM(S): at 05:19

## 2023-08-25 RX ADMIN — PANTOPRAZOLE SODIUM 40 MILLIGRAM(S): 20 TABLET, DELAYED RELEASE ORAL at 05:19

## 2023-08-25 RX ADMIN — SODIUM CHLORIDE 75 MILLILITER(S): 9 INJECTION, SOLUTION INTRAVENOUS at 13:55

## 2023-08-25 RX ADMIN — HEPARIN SODIUM 5000 UNIT(S): 5000 INJECTION INTRAVENOUS; SUBCUTANEOUS at 21:46

## 2023-08-25 RX ADMIN — Medication 30 MILLILITER(S): at 02:29

## 2023-08-25 RX ADMIN — HEPARIN SODIUM 5000 UNIT(S): 5000 INJECTION INTRAVENOUS; SUBCUTANEOUS at 15:10

## 2023-08-25 RX ADMIN — PANTOPRAZOLE SODIUM 40 MILLIGRAM(S): 20 TABLET, DELAYED RELEASE ORAL at 18:15

## 2023-08-25 NOTE — MEDICAL STUDENT PROGRESS NOTE(EDUCATION) - ASSESSMENT
83 y/o woman with PMH of HTN on garlic presented with lightheadedness and decreased po intake for the past 3 weeks. In the ED, she was found to have hypertensive urgency: 231/102.    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 DASH and low sodium- soft and bite-sized diet  - BP controlled but Cr uptrending to 1.5 (8/24) -> d/c losartan (8/24)  - Cr now downtrending to 1.4  - cont. nifedipine 90mg QD at bedtime  - d/c metoprolol succinate 25mg QD  - start HCTZ 12.5mg QD in AM  - will need close outpatient f/u for HTN -> has [facility] appt on 9/7 w/ Dr. Quijano    #Decreased PO intake due to nausea due to denture paste  - cont. Zofran PRN for nausea  - Pt advised to f/u with her own dentist for re-evaluation  - pt received Miralax and senna daily for constipation but then had diarrhea -> Miralax and senna now given PRN  - heartburn sensation - cont. Protonix 40mg PO BID    #Incidental bilateral Thyroid nodules  - CTA of head and neck (8/18) showed bilateral thyroid nodules measuring up to 1.4cm in R thyroid lobe  - TSH, fT4 lvls within normal range  - recommend outpt thyroid U/S    #Elevated LFTs  - ,  (8/24) - downtrending to ,  (8/25)  - RUQ U/S (8/24): diffuse hepatic steatosis, nonmobile gallbladder wall echogenic foci measuring up to 1cm, likely polyps -> f/u exam in 6 months recommended                                                                # DVT prophylaxis: heparin 5000u SQ q8h  # GI prophylaxis: Protonix 40mg BID  # Diet: soft and bite-sized - consistent carbohydrate (no snacks) - DASH/TLC  # Activity Score (AM-PAC): no restrictions - home PT vs. outpt PT  # Code status: full    # Disposition: home 83 y/o woman with PMH of HTN on garlic presented with lightheadedness and decreased po intake for the past 3 weeks. In the ED, she was found to have hypertensive urgency: 231/102.    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 DASH and low sodium- soft and bite-sized diet  - BP controlled but Cr uptrending to 1.5 (8/24) -> d/c losartan (8/24)  - Cr now downtrending to 1.4  - cont. nifedipine 90mg QD at bedtime  - d/c metoprolol succinate 25mg QD  - start HCTZ 12.5mg QD in AM  - will need close outpatient f/u for HTN -> has [facility] appt on 9/7 w/ Dr. Quijano    #Decreased PO intake due to nausea due to denture paste  - cont. Zofran PRN for nausea  - Pt advised to f/u with her own dentist for re-evaluation  - pt received Miralax and senna daily for constipation but then had diarrhea -> Miralax and senna now given PRN  - heartburn sensation - cont. Protonix 40mg PO BID    #Incidental bilateral Thyroid nodules  - CTA of head and neck (8/18) showed bilateral thyroid nodules measuring up to 1.4cm in R thyroid lobe  - TSH, fT4 lvls within normal range  - recommend outpt thyroid U/S    #Elevated LFTs  - ,  (8/24) - downtrending to ,  (8/25)  - RUQ U/S (8/24): diffuse hepatic steatosis, nonmobile gallbladder wall echogenic foci measuring up to 1cm, likely polyps -> f/u exam in 6 months recommended  - f/u outpt                                                                # DVT prophylaxis: heparin 5000u SQ q8h  # GI prophylaxis: Protonix 40mg BID  # Diet: soft and bite-sized - consistent carbohydrate (no snacks) - DASH/TLC  # Activity Score (AM-PAC): no restrictions - home PT vs. outpt PT  # Code status: full    # Disposition: home 81 y/o woman with PMH of HTN on garlic presented with lightheadedness and decreased po intake for the past 3 weeks. In the ED, she was found to have hypertensive urgency: 231/102.    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 DASH and low sodium- soft and bite-sized diet  - BP controlled but Cr uptrending to 1.5 (8/24) -> d/c losartan (8/24)  - Cr now downtrending to 1.4 - started on LR 1000mL 75mL/hr for 12 hrs  - cont. nifedipine 90mg QD at bedtime  - d/c metoprolol succinate 25mg QD  - start HCTZ 12.5mg QD in AM  - will need close outpatient f/u for HTN -> has [facility] appt on 9/7 w/ Dr. Quijano    #Decreased PO intake due to nausea due to denture paste  - cont. Zofran PRN for nausea  - Pt advised to f/u with her own dentist for re-evaluation  - pt received Miralax and senna daily for constipation but then had diarrhea -> Miralax and senna now given PRN  - heartburn sensation - cont. Protonix 40mg PO BID    #Incidental bilateral Thyroid nodules  - CTA of head and neck (8/18) showed bilateral thyroid nodules measuring up to 1.4cm in R thyroid lobe  - TSH, fT4 lvls within normal range  - recommend outpt thyroid U/S    #Elevated LFTs  - ,  (8/24) - downtrending to ,  (8/25)  - RUQ U/S (8/24): diffuse hepatic steatosis, nonmobile gallbladder wall echogenic foci measuring up to 1cm, likely polyps -> f/u exam in 6 months recommended  - f/u outpt                                                                # DVT prophylaxis: heparin 5000u SQ q8h  # GI prophylaxis: Protonix 40mg BID  # Diet: soft and bite-sized - consistent carbohydrate (no snacks) - DASH/TLC  # Activity Score (AM-PAC): no restrictions - home PT vs. outpt PT  # Code status: full    # Disposition: home

## 2023-08-25 NOTE — PROGRESS NOTE ADULT - ASSESSMENT
81 y/o woman with PMH of HTN on garlic presented with lightheadedness and decreased po intake for the past 3 weeks.      Possibel metabolic encephaloapthy due to hyoertensive urgency  HTH urgency  CHRIS with CKD stage 3  Mild transaminitis      Stroke ruled out  Negative orthostatics  CHRIS with Mild transaminitis stable but not improving. US ruq showing Diffuse hepatic steatosis. RL 75 ml for 12 hrs. renal US  Thyroid panel unremarkable  No dyslipidemia   BP- added low dose HCTZ along with procardia 90  [facility] appt: Sept 7 at 2:15PM with Dr. Quijano  Handoff: Discharge anticipated tomorrow pending inmprovement in rebal function     83 y/o woman with PMH of HTN on garlic presented with lightheadedness and decreased po intake for the past 3 weeks.      Possibel metabolic encephaloapthy due to hyoertensive urgency  HTH urgency  CHRIS with CKD stage 3  Mild transaminitis  pre-diabetes      Stroke ruled out  Negative orthostatics  CHRIS with Mild transaminitis stable but not improving. US ruq showing Diffuse hepatic steatosis. RL 75 ml for 12 hrs. renal US  Thyroid panel unremarkable  No dyslipidemia   pre-diabetes- Addendum appended to discharge summary. consistent carb diet  BP- added low dose HCTZ along with procardia 90  [facility] clinic appt: Sept 7 at 2:15PM with Dr. Quijano  Handoff: Discharge anticipated tomorrow pending improvement in renal function     83 y/o woman with PMH of HTN on garlic presented with lightheadedness and decreased po intake for the past 3 weeks.      Possible metabolic encephalopathy due to hypertensive urgency  HTH urgency  CHRIS with CKD stage 3  Mild transaminitis  pre-diabetes      Stroke ruled out  Negative orthostatics  CHRIS with Mild transaminitis stable but not improving. US ruq showing Diffuse hepatic steatosis. RL 75 ml for 12 hrs. renal US  Thyroid panel unremarkable  No dyslipidemia   pre-diabetes- Addendum appended to discharge summary. consistent carb diet  BP- added low dose HCTZ along with procardia 90  [facility] clinic appt: Sept 7 at 2:15PM with Dr. Quijano  Handoff: Discharge anticipated tomorrow pending improvement in renal function     83 y/o woman with PMH of HTN on garlic presented with lightheadedness and decreased po intake for the past 3 weeks.      Possible metabolic encephalopathy due to hypertensive urgency  HTH urgency  CHRIS with CKD stage 3  Mild transaminitis  pre-diabetes      Stroke ruled out  Negative orthostatics  CHRIS with Mild transaminitis stable but not improving. US ruq showing Diffuse hepatic steatosis. RL 75 ml for 12 hrs. renal US  Thyroid panel unremarkable  No dyslipidemia   pre-diabetes- consistent carb diet  BP- added low dose HCTZ along with procardia 90  [facility] clinic appt: Sept 7 at 2:15PM with Dr. Quijano  Handoff: Discharge anticipated tomorrow pending improvement in renal function

## 2023-08-25 NOTE — MEDICAL STUDENT PROGRESS NOTE(EDUCATION) - SUBJECTIVE AND OBJECTIVE BOX
MIKEL PEDROZA 82y Female  MRN#: [id number]   CODE STATUS:________    Hospital Day: 7d    Pt is currently admitted with the primary diagnosis of     SUBJECTIVE    Hospital Course    Overnight events    Subjective complaints    Present Today:   - Anai:  No [  ], Yes [   ] : Indication:     - Type of IV Access:       .. CVC/Piccline:  No [  ], Yes [   ] : Indication:       .. Midline: No [  ], Yes [   ] : Indication:                                             ----------------------------------------------------------  OBJECTIVE  PAST MEDICAL & SURGICAL HISTORY                                            -----------------------------------------------------------  ALLERGIES:  Allergy Status Unknown                                            ------------------------------------------------------------    HOME MEDICATIONS  Home Medications:                           MEDICATIONS:  STANDING MEDICATIONS  heparin   Injectable 5000 Unit(s) SubCutaneous every 8 hours  hydrochlorothiazide 12.5 milliGRAM(s) Oral daily  NIFEdipine XL 90 milliGRAM(s) Oral at bedtime  pantoprazole    Tablet 40 milliGRAM(s) Oral two times a day    PRN MEDICATIONS  aluminum hydroxide/magnesium hydroxide/simethicone Suspension 30 milliLiter(s) Oral every 4 hours PRN  ondansetron    Tablet 4 milliGRAM(s) Oral three times a day PRN  polyethylene glycol 3350 17 Gram(s) Oral two times a day PRN  senna 2 Tablet(s) Oral at bedtime PRN                                            ------------------------------------------------------------  VITAL SIGNS: Last 24 Hours  T(C): 36.6 (25 Aug 2023 05:12), Max: 37.1 (24 Aug 2023 14:24)  T(F): 97.9 (25 Aug 2023 05:12), Max: 98.7 (24 Aug 2023 14:24)  HR: 88 (25 Aug 2023 05:12) (83 - 106)  BP: 127/61 (25 Aug 2023 05:12) (112/72 - 161/74)  BP(mean): --  RR: 18 (25 Aug 2023 05:12) (18 - 18)  SpO2: --                                             --------------------------------------------------------------  LABS:                        14.3   8.18  )-----------( 308      ( 25 Aug 2023 07:05 )             42.7     08-25    141  |  102  |  18  ----------------------------<  124<H>  4.0   |  24  |  1.4    Ca    9.2      25 Aug 2023 07:05  Mg     2.5     08-25    TPro  6.5  /  Alb  4.0  /  TBili  0.5  /  DBili  x   /  AST  101<H>  /  ALT  107<H>  /  AlkPhos  57  08-25      Urinalysis Basic - ( 25 Aug 2023 07:05 )    Color: x / Appearance: x / SG: x / pH: x  Gluc: 124 mg/dL / Ketone: x  / Bili: x / Urobili: x   Blood: x / Protein: x / Nitrite: x   Leuk Esterase: x / RBC: x / WBC x   Sq Epi: x / Non Sq Epi: x / Bacteria: x                                                            -------------------------------------------------------------  RADIOLOGY:  ACC: [id number] EXAM:  US ABDOMEN RT UPR QUADRANT   ORDERED BY: MONROE ROLDAN     PROCEDURE DATE:  08/24/2023          INTERPRETATION:  CLINICAL INFORMATION: Elevated liver enzymes    COMPARISON: None available.    TECHNIQUE: Sonography of the right upper quadrant.    FINDINGS:  Liver: Increased echogenicity.  Bile ducts: Normal caliber. Common bile duct measures 4 mm.  Gallbladder: Nonmobile echogenic foci along gallbladder wall measuring up   to 1.0 cm, likely polyps.  Pancreas: Poorly visualized.  Right kidney: 8.3 cm. No hydronephrosis.  Ascites: None.  IVC: Visualized portions are within normal limits.    IMPRESSION:    Diffuse hepatic steatosis.    Nonmobile gallbladder wall echogenic foci measuring up to 1.0 cm, likely   polyps. Follow-up examination in 6 months recommended.    --- End of Report ---    ACC: [id number] EXAM:  MR BRAIN   ORDERED BY: LUNA MARRERO     PROCEDURE DATE:  08/21/2023          INTERPRETATION:  Clinical History / Reason for exam: Dizziness.    MRI OF THE BRAIN WITHOUT CONTRAST    TECHNIQUE:    Multiplanar multisequence imaging of the brain was performed.    COMPARISON:    Noncontrast CT scan of the brain dated August 18, 2023.    FINDINGS:    The third and lateral ventricles are mildly enlarged as are the cortical   sulci consistent with a mild degree of cortical atrophy. The fourth   ventricle is normal in size and position.    There is no shift of the midline structures.    Mild thinning of the body of the corpus callosum consistent with mild   corpus callosal atrophy.    On the T2 and FLAIR images there are patchy and punctate foci of   hyperintense signal intensity in the periventricular white matter,   subcortical white matter, and brainstem likely representing chronic   ischemic change.    No MRI evidence to suggest acute ischemic change.    There is a partially empty sella.    Mucosal thickening in the sphenoid sinuses.    IMPRESSION:    1.  No MRI evidence to suggest acute ischemic change.    2.  Cerebral and corpus callosal atrophy.    3.  T2/FLAIR hyperintensities in the periventricular white matter,   subcortical white matter, and brainstem likely representing chronic   ischemic change.      --- End of Report ---    ACC: [id number] EXAM:  CT ANGIO NECK (W)AW IC   ORDERED BY: VARSHA WADSWORTH     ACC: [id number] EXAM:  CT ANGIO BRAIN (W)AW IC   ORDERED BY: VARSHA WADSWORTH     PROCEDURE DATE:  08/18/2023          INTERPRETATION:  CLINICAL HISTORY / REASON FOREXAM: dizziness    TECHNIQUE: CTA of the head and neck was obtained following the   intravenous administration intravenous contrast. Sagittal, coronal and   axial reformatted images were obtained as well as 3-D volume rendered   images.    CONTRAST VOLUME: Omnipaque 350 IV contrast. 100 cc administered. 0 cc   discarded.    COMPARISON: None.    FINDINGS:    CTA Neck:  The visualized aortic arch and great vessel origins demonstrate calcific   plaque without significant stenosis. 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 There is a direct origin of the   left vertebral artery from the aortic arch, normal variant.    The common, internal and external carotid arteries are patent.    The vertebral arteries are patent.    CTA Head:  The distal internal carotid arteries, middle cerebral arteries and   anterior cerebral arteries are unremarkable without significant stenosis.    The basilar artery is patent without significant stenosis. The proximal   branch vasculature of the posterior circulation is within normal limits.   The right posterior cerebral artery is patent without significant   stenosis. 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    The bilateral intracranial vertebral arteries are unremarkable without   significant stenosis.    No evidence of dural venous sinus thrombosis.    Other:  Degenerative changes of the spine.  Bilateral thyroid nodules measuring up to 1.4 cm in the right thyroid   lobe.  Severe degenerative changes of the temporomandibular joints.    IMPRESSION:    1.  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    2.  No other significant stenosis demonstrated.    3.  Bilateral thyroid nodules measuring up to 1.4 cm on the right.   Further evaluation withthyroid sonogram may be obtained on an   outpatient/elective basis.    --- End of Report ---    ACC: [id number] EXAM:  CT BRAIN   ORDERED BY: VARSHA WADSWORTH     PROCEDURE DATE:  08/18/2023          INTERPRETATION:  CLINICAL INDICATION: Dizziness    TECHNIQUE: CT of the head was performed without the administration of   intravenous contrast. Coronal and sagittal reformats were obtained.    COMPARISON: None available.    FINDINGS:    Age-appropriate sulci, sylvian fissures, and ventricles.    There are scattered patchy low attenuations in the periventricular   cerebral white matter, reflecting mild chronic microvascular ischemic   changes.    There is no acute territorial infarct, intracranial hemorrhage, mass   effect, or midline shift.    No evidence of hydrocephalus. No extra-axial fluid collections. Right   basal ganglia calcification.    The visualized intraorbital contents are unremarkable. Mild mucosal   plantar thickening of the bilateral sphenoid sinuses. The mastoid air   cells are aerated.    No acute depressed calvarial fracture.    IMPRESSION:    No evidence for acute intracranial pathology.    --- End of Report ---    ACC: [id number] EXAM:  XR CHEST PORTABLE URGENT 1V   ORDERED BY: VARSHA WADSWORTH     PROCEDURE DATE:  08/18/2023          INTERPRETATION:  Clinical History / Reason for exam: Chest pain.    Comparison : None.    Technique/Positioning: Single frontal chest x-ray obtained.    Findings:    Support devices: None.    Cardiac/mediastinum/hilum: Unremarkable.    Lung parenchyma/Pleura: Within normal limits.    Skeleton/soft tissues: Calcified breast prostheses. Degenerative change,   thoracic spine    Impression:    No radiographic evidence of acute cardiopulmonary disease.        --- End of Report ---                                              --------------------------------------------------------------    PHYSICAL EXAM:  GENERAL: comfortable, in NAD   HEENT: NCAT  LUNGS: CTAB, Good air entry bilaterally   HEART: RRR, +S1,S2  ABDOMEN: NTTP, ND x 4 q's  EXT: Warm, well perfused x 4  NEURO: AxOx3, No FND's noted  SKIN: No new breakdown or rashes noted                                             MIKEL PEDROZA 82y Female  MRN#: [id number]   CODE STATUS: FULL    Hospital Day: 7d    Pt is currently admitted with the primary diagnosis of dizziness and giddiness    SUBJECTIVE    There were no overnight events. Pt was seen and examined at bedside today. Pt was found walking around in her room. She stated that she has been eating more and feels much better. Pt has also been urinating and having bowel movements. She feels ready to go home.    Pt's BP is controlled; however, she will be switched from metoprolol succinate ER 25mg PO QD to HCTZ 12.5mg PO QD as she has no cardiac conditions.    Present Today:   - Ospina:  No [ X ], Yes [   ] : Indication:     - Type of IV Access:       .. CVC/Piccline:  No [ X ], Yes [   ] : Indication:       .. Midline: No [ X ], Yes [   ] : Indication:                                             ----------------------------------------------------------  OBJECTIVE  PAST MEDICAL & SURGICAL HISTORY                                            -----------------------------------------------------------  ALLERGIES:  Allergy Status Unknown                                            ------------------------------------------------------------    HOME MEDICATIONS  Home Medications:                           MEDICATIONS:  STANDING MEDICATIONS  heparin   Injectable 5000 Unit(s) SubCutaneous every 8 hours  hydrochlorothiazide 12.5 milliGRAM(s) Oral daily  NIFEdipine XL 90 milliGRAM(s) Oral at bedtime  pantoprazole    Tablet 40 milliGRAM(s) Oral two times a day    PRN MEDICATIONS  aluminum hydroxide/magnesium hydroxide/simethicone Suspension 30 milliLiter(s) Oral every 4 hours PRN  ondansetron    Tablet 4 milliGRAM(s) Oral three times a day PRN  polyethylene glycol 3350 17 Gram(s) Oral two times a day PRN  senna 2 Tablet(s) Oral at bedtime PRN                                            ------------------------------------------------------------  VITAL SIGNS: Last 24 Hours  T(C): 36.6 (25 Aug 2023 05:12), Max: 37.1 (24 Aug 2023 14:24)  T(F): 97.9 (25 Aug 2023 05:12), Max: 98.7 (24 Aug 2023 14:24)  HR: 88 (25 Aug 2023 05:12) (83 - 106)  BP: 127/61 (25 Aug 2023 05:12) (112/72 - 161/74)  BP(mean): --  RR: 18 (25 Aug 2023 05:12) (18 - 18)  SpO2: --                                             --------------------------------------------------------------  LABS:                        14.3   8.18  )-----------( 308      ( 25 Aug 2023 07:05 )             42.7     08-25    141  |  102  |  18  ----------------------------<  124<H>  4.0   |  24  |  1.4    Ca    9.2      25 Aug 2023 07:05  Mg     2.5     08-25    TPro  6.5  /  Alb  4.0  /  TBili  0.5  /  DBili  x   /  AST  101<H>  /  ALT  107<H>  /  AlkPhos  57  08-25      Urinalysis Basic - ( 25 Aug 2023 07:05 )    Color: x / Appearance: x / SG: x / pH: x  Gluc: 124 mg/dL / Ketone: x  / Bili: x / Urobili: x   Blood: x / Protein: x / Nitrite: x   Leuk Esterase: x / RBC: x / WBC x   Sq Epi: x / Non Sq Epi: x / Bacteria: x                                                            -------------------------------------------------------------  RADIOLOGY:  ACC: [id number] EXAM:  US ABDOMEN RT UPR QUADRANT   ORDERED BY: MONROE ROLDAN     PROCEDURE DATE:  08/24/2023          INTERPRETATION:  CLINICAL INFORMATION: Elevated liver enzymes    COMPARISON: None available.    TECHNIQUE: Sonography of the right upper quadrant.    FINDINGS:  Liver: Increased echogenicity.  Bile ducts: Normal caliber. Common bile duct measures 4 mm.  Gallbladder: Nonmobile echogenic foci along gallbladder wall measuring up   to 1.0 cm, likely polyps.  Pancreas: Poorly visualized.  Right kidney: 8.3 cm. No hydronephrosis.  Ascites: None.  IVC: Visualized portions are within normal limits.    IMPRESSION:    Diffuse hepatic steatosis.    Nonmobile gallbladder wall echogenic foci measuring up to 1.0 cm, likely   polyps. Follow-up examination in 6 months recommended.    --- End of Report ---    ACC: [id number] EXAM:  MR BRAIN   ORDERED BY: LUNA MARRERO     PROCEDURE DATE:  08/21/2023          INTERPRETATION:  Clinical History / Reason for exam: Dizziness.    MRI OF THE BRAIN WITHOUT CONTRAST    TECHNIQUE:    Multiplanar multisequence imaging of the brain was performed.    COMPARISON:    Noncontrast CT scan of the brain dated August 18, 2023.    FINDINGS:    The third and lateral ventricles are mildly enlarged as are the cortical   sulci consistent with a mild degree of cortical atrophy. The fourth   ventricle is normal in size and position.    There is no shift of the midline structures.    Mild thinning of the body of the corpus callosum consistent with mild   corpus callosal atrophy.    On the T2 and FLAIR images there are patchy and punctate foci of   hyperintense signal intensity in the periventricular white matter,   subcortical white matter, and brainstem likely representing chronic   ischemic change.    No MRI evidence to suggest acute ischemic change.    There is a partially empty sella.    Mucosal thickening in the sphenoid sinuses.    IMPRESSION:    1.  No MRI evidence to suggest acute ischemic change.    2.  Cerebral and corpus callosal atrophy.    3.  T2/FLAIR hyperintensities in the periventricular white matter,   subcortical white matter, and brainstem likely representing chronic   ischemic change.      --- End of Report ---    ACC: [id number] EXAM:  CT ANGIO NECK (W)AW IC   ORDERED BY: VARSHA WADSWORTH     ACC: [id number] EXAM:  CT ANGIO BRAIN (W)AW IC   ORDERED BY: VARSHA WADSWORTH     PROCEDURE DATE:  08/18/2023          INTERPRETATION:  CLINICAL HISTORY / REASON FOREXAM: dizziness    TECHNIQUE: CTA of the head and neck was obtained following the   intravenous administration intravenous contrast. Sagittal, coronal and   axial reformatted images were obtained as well as 3-D volume rendered   images.    CONTRAST VOLUME: Omnipaque 350 IV contrast. 100 cc administered. 0 cc   discarded.    COMPARISON: None.    FINDINGS:    CTA Neck:  The visualized aortic arch and great vessel origins demonstrate calcific   plaque without significant stenosis. 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 There is a direct origin of the   left vertebral artery from the aortic arch, normal variant.    The common, internal and external carotid arteries are patent.    The vertebral arteries are patent.    CTA Head:  The distal internal carotid arteries, middle cerebral arteries and   anterior cerebral arteries are unremarkable without significant stenosis.    The basilar artery is patent without significant stenosis. The proximal   branch vasculature of the posterior circulation is within normal limits.   The right posterior cerebral artery is patent without significant   stenosis. 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    The bilateral intracranial vertebral arteries are unremarkable without   significant stenosis.    No evidence of dural venous sinus thrombosis.    Other:  Degenerative changes of the spine.  Bilateral thyroid nodules measuring up to 1.4 cm in the right thyroid   lobe.  Severe degenerative changes of the temporomandibular joints.    IMPRESSION:    1.  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    2.  No other significant stenosis demonstrated.    3.  Bilateral thyroid nodules measuring up to 1.4 cm on the right.   Further evaluation withthyroid sonogram may be obtained on an   outpatient/elective basis.    --- End of Report ---    ACC: [id number] EXAM:  CT BRAIN   ORDERED BY: VARSHA WADSWORTH     PROCEDURE DATE:  08/18/2023          INTERPRETATION:  CLINICAL INDICATION: Dizziness    TECHNIQUE: CT of the head was performed without the administration of   intravenous contrast. Coronal and sagittal reformats were obtained.    COMPARISON: None available.    FINDINGS:    Age-appropriate sulci, sylvian fissures, and ventricles.    There are scattered patchy low attenuations in the periventricular   cerebral white matter, reflecting mild chronic microvascular ischemic   changes.    There is no acute territorial infarct, intracranial hemorrhage, mass   effect, or midline shift.    No evidence of hydrocephalus. No extra-axial fluid collections. Right   basal ganglia calcification.    The visualized intraorbital contents are unremarkable. Mild mucosal   plantar thickening of the bilateral sphenoid sinuses. The mastoid air   cells are aerated.    No acute depressed calvarial fracture.    IMPRESSION:    No evidence for acute intracranial pathology.    --- End of Report ---    ACC: [id number] EXAM:  XR CHEST PORTABLE URGENT 1V   ORDERED BY: VARSHA WADSWORTH     PROCEDURE DATE:  08/18/2023          INTERPRETATION:  Clinical History / Reason for exam: Chest pain.    Comparison : None.    Technique/Positioning: Single frontal chest x-ray obtained.    Findings:    Support devices: None.    Cardiac/mediastinum/hilum: Unremarkable.    Lung parenchyma/Pleura: Within normal limits.    Skeleton/soft tissues: Calcified breast prostheses. Degenerative change,   thoracic spine    Impression:    No radiographic evidence of acute cardiopulmonary disease.        --- End of Report ---                                              --------------------------------------------------------------    PHYSICAL EXAM:  GENERAL: comfortable, in NAD   HEENT: NCAT  LUNGS: CTAB, Good air entry bilaterally   HEART: RRR, +S1,S2  ABDOMEN: NTTP, ND x 4 q's  EXT: Warm, well perfused x 4  NEURO: AxOx3, No FND's noted  SKIN: No new breakdown or rashes noted

## 2023-08-26 VITALS — HEART RATE: 85 BPM | OXYGEN SATURATION: 96 % | RESPIRATION RATE: 18 BRPM

## 2023-08-26 LAB
ALBUMIN SERPL ELPH-MCNC: 4.1 G/DL — SIGNIFICANT CHANGE UP (ref 3.5–5.2)
ALP SERPL-CCNC: 56 U/L — SIGNIFICANT CHANGE UP (ref 30–115)
ALT FLD-CCNC: 104 U/L — HIGH (ref 0–41)
ANION GAP SERPL CALC-SCNC: 15 MMOL/L — HIGH (ref 7–14)
AST SERPL-CCNC: 83 U/L — HIGH (ref 0–41)
BASOPHILS # BLD AUTO: 0.04 K/UL — SIGNIFICANT CHANGE UP (ref 0–0.2)
BASOPHILS NFR BLD AUTO: 0.6 % — SIGNIFICANT CHANGE UP (ref 0–1)
BILIRUB SERPL-MCNC: 0.4 MG/DL — SIGNIFICANT CHANGE UP (ref 0.2–1.2)
BUN SERPL-MCNC: 14 MG/DL — SIGNIFICANT CHANGE UP (ref 10–20)
CALCIUM SERPL-MCNC: 9.3 MG/DL — SIGNIFICANT CHANGE UP (ref 8.4–10.5)
CHLORIDE SERPL-SCNC: 102 MMOL/L — SIGNIFICANT CHANGE UP (ref 98–110)
CO2 SERPL-SCNC: 24 MMOL/L — SIGNIFICANT CHANGE UP (ref 17–32)
CREAT SERPL-MCNC: 1.4 MG/DL — SIGNIFICANT CHANGE UP (ref 0.7–1.5)
EGFR: 38 ML/MIN/1.73M2 — LOW
EOSINOPHIL # BLD AUTO: 0.16 K/UL — SIGNIFICANT CHANGE UP (ref 0–0.7)
EOSINOPHIL NFR BLD AUTO: 2.2 % — SIGNIFICANT CHANGE UP (ref 0–8)
GLUCOSE BLDC GLUCOMTR-MCNC: 114 MG/DL — HIGH (ref 70–99)
GLUCOSE SERPL-MCNC: 108 MG/DL — HIGH (ref 70–99)
HCT VFR BLD CALC: 43 % — SIGNIFICANT CHANGE UP (ref 37–47)
HGB BLD-MCNC: 14 G/DL — SIGNIFICANT CHANGE UP (ref 12–16)
IMM GRANULOCYTES NFR BLD AUTO: 0.3 % — SIGNIFICANT CHANGE UP (ref 0.1–0.3)
LYMPHOCYTES # BLD AUTO: 2.41 K/UL — SIGNIFICANT CHANGE UP (ref 1.2–3.4)
LYMPHOCYTES # BLD AUTO: 33.6 % — SIGNIFICANT CHANGE UP (ref 20.5–51.1)
MAGNESIUM SERPL-MCNC: 2.5 MG/DL — HIGH (ref 1.8–2.4)
MCHC RBC-ENTMCNC: 30.4 PG — SIGNIFICANT CHANGE UP (ref 27–31)
MCHC RBC-ENTMCNC: 32.6 G/DL — SIGNIFICANT CHANGE UP (ref 32–37)
MCV RBC AUTO: 93.5 FL — SIGNIFICANT CHANGE UP (ref 81–99)
MONOCYTES # BLD AUTO: 0.62 K/UL — HIGH (ref 0.1–0.6)
MONOCYTES NFR BLD AUTO: 8.6 % — SIGNIFICANT CHANGE UP (ref 1.7–9.3)
NEUTROPHILS # BLD AUTO: 3.92 K/UL — SIGNIFICANT CHANGE UP (ref 1.4–6.5)
NEUTROPHILS NFR BLD AUTO: 54.7 % — SIGNIFICANT CHANGE UP (ref 42.2–75.2)
NRBC # BLD: 0 /100 WBCS — SIGNIFICANT CHANGE UP (ref 0–0)
PLATELET # BLD AUTO: 308 K/UL — SIGNIFICANT CHANGE UP (ref 130–400)
PMV BLD: 10.7 FL — HIGH (ref 7.4–10.4)
POTASSIUM SERPL-MCNC: 4.5 MMOL/L — SIGNIFICANT CHANGE UP (ref 3.5–5)
POTASSIUM SERPL-SCNC: 4.5 MMOL/L — SIGNIFICANT CHANGE UP (ref 3.5–5)
PROT SERPL-MCNC: 6.6 G/DL — SIGNIFICANT CHANGE UP (ref 6–8)
RBC # BLD: 4.6 M/UL — SIGNIFICANT CHANGE UP (ref 4.2–5.4)
RBC # FLD: 12.7 % — SIGNIFICANT CHANGE UP (ref 11.5–14.5)
SODIUM SERPL-SCNC: 141 MMOL/L — SIGNIFICANT CHANGE UP (ref 135–146)
WBC # BLD: 7.17 K/UL — SIGNIFICANT CHANGE UP (ref 4.8–10.8)
WBC # FLD AUTO: 7.17 K/UL — SIGNIFICANT CHANGE UP (ref 4.8–10.8)

## 2023-08-26 PROCEDURE — 99239 HOSP IP/OBS DSCHRG MGMT >30: CPT

## 2023-08-26 RX ORDER — HYDROCHLOROTHIAZIDE 25 MG
1 TABLET ORAL
Qty: 0 | Refills: 0 | DISCHARGE
Start: 2023-08-26

## 2023-08-26 RX ORDER — SENNA PLUS 8.6 MG/1
2 TABLET ORAL
Qty: 60 | Refills: 0
Start: 2023-08-26 | End: 2023-09-24

## 2023-08-26 RX ORDER — PANTOPRAZOLE SODIUM 20 MG/1
1 TABLET, DELAYED RELEASE ORAL
Qty: 0 | Refills: 0 | DISCHARGE
Start: 2023-08-26

## 2023-08-26 RX ORDER — NIFEDIPINE 30 MG
1 TABLET, EXTENDED RELEASE 24 HR ORAL
Qty: 0 | Refills: 0 | DISCHARGE
Start: 2023-08-26

## 2023-08-26 RX ORDER — PANTOPRAZOLE SODIUM 20 MG/1
1 TABLET, DELAYED RELEASE ORAL
Qty: 30 | Refills: 0
Start: 2023-08-26 | End: 2023-09-24

## 2023-08-26 RX ORDER — NIFEDIPINE 30 MG
1 TABLET, EXTENDED RELEASE 24 HR ORAL
Qty: 30 | Refills: 0
Start: 2023-08-26 | End: 2023-09-24

## 2023-08-26 RX ORDER — HYDROCHLOROTHIAZIDE 25 MG
1 TABLET ORAL
Qty: 30 | Refills: 0
Start: 2023-08-26 | End: 2023-09-24

## 2023-08-26 RX ADMIN — Medication 30 MILLILITER(S): at 01:19

## 2023-08-26 RX ADMIN — HEPARIN SODIUM 5000 UNIT(S): 5000 INJECTION INTRAVENOUS; SUBCUTANEOUS at 06:08

## 2023-08-26 RX ADMIN — PANTOPRAZOLE SODIUM 40 MILLIGRAM(S): 20 TABLET, DELAYED RELEASE ORAL at 06:07

## 2023-08-26 NOTE — PROGRESS NOTE ADULT - PROVIDER SPECIALTY LIST ADULT
Hospitalist
Internal Medicine
Hospitalist
Internal Medicine

## 2023-08-26 NOTE — PROGRESS NOTE ADULT - SUBJECTIVE AND OBJECTIVE BOX
MIKEL PEDROZA  82y  Female      Patient is a 82y old  Female who presents with a chief complaint of     INTERVAL HPI/OVERNIGHT EVENTS:  She is still with nausea, no dizziness, no spinning sensation, she had episode of diarrhea, water like stool, no abdominal pain.   Vital Signs Last 24 Hrs  T(C): 36.7 (19 Aug 2023 04:43), Max: 37.2 (18 Aug 2023 15:33)  T(F): 98 (19 Aug 2023 04:43), Max: 98.9 (18 Aug 2023 15:33)  HR: 93 (19 Aug 2023 07:41) (86 - 99)  BP: 164/79 (19 Aug 2023 07:41) (152/70 - 216/95)  BP(mean): 113 (19 Aug 2023 07:41) (101 - 113)  RR: 20 (19 Aug 2023 07:41) (18 - 20)  SpO2: 98% (19 Aug 2023 07:41) (98% - 99%)    Parameters below as of 19 Aug 2023 07:41  Patient On (Oxygen Delivery Method): room air          08-19-23 @ 07:01  -  08-19-23 @ 12:32  --------------------------------------------------------  IN: 487 mL / OUT: 0 mL / NET: 487 mL            Consultant(s) Notes Reviewed:  [x ] YES  [ ] NO          MEDICATIONS  (STANDING):  heparin   Injectable 5000 Unit(s) SubCutaneous every 12 hours  NIFEdipine XL 60 milliGRAM(s) Oral at bedtime  pantoprazole    Tablet 40 milliGRAM(s) Oral daily    MEDICATIONS  (PRN):  ondansetron    Tablet 4 milliGRAM(s) Oral three times a day PRN Nausea and/or Vomiting      LABS                          14.6   8.65  )-----------( 330      ( 19 Aug 2023 05:56 )             43.1     08-19    140  |  104  |  6<L>  ----------------------------<  123<H>  3.5   |  22  |  0.9    Ca    9.3      19 Aug 2023 05:56  Mg     2.1     08-18    TPro  6.4  /  Alb  4.1  /  TBili  0.4  /  DBili  x   /  AST  21  /  ALT  18  /  AlkPhos  57  08-18      Urinalysis Basic - ( 19 Aug 2023 05:56 )    Color: x / Appearance: x / SG: x / pH: x  Gluc: 123 mg/dL / Ketone: x  / Bili: x / Urobili: x   Blood: x / Protein: x / Nitrite: x   Leuk Esterase: x / RBC: x / WBC x   Sq Epi: x / Non Sq Epi: x / Bacteria: x        Lactate Trend    CARDIAC MARKERS ( 18 Aug 2023 00:23 )  x     / <0.01 ng/mL / x     / x     / x          CAPILLARY BLOOD GLUCOSE      POCT Blood Glucose.: 152 mg/dL (18 Aug 2023 00:22)        RADIOLOGY & ADDITIONAL TESTS:    Imaging Personally Reviewed:  [ ] YES  [ ] NO    HEALTH ISSUES - PROBLEM Dx:          PHYSICAL EXAM:  GENERAL: NAD, well-developed.  HEAD:  Atraumatic, Normocephalic.  EYES: EOMI, PERRLA, conjunctiva and sclera clear.  NECK: Supple, No JVD.  CHEST/LUNG: Clear to auscultation bilaterally; No wheeze.  HEART: Regular rate and rhythm; S1 S2.   ABDOMEN: Soft, Nontender, Nondistended; Bowel sounds present.  EXTREMITIES:  2+ Peripheral Pulses, No clubbing, cyanosis, or edema.  PSYCH: AAOx3.  NEUROLOGY: non-focal.  SKIN: No rashes or lesions.
24H events:    Patient is a 82y old Female who presents with a chief complaint of lightheadedness and decreased po intake.    Primary diagnosis of Lightheadedness    Today is hospital day 4d. This morning patient was seen and examined at bedside, resting comfortably in bed.  Patient complains of mild dizziness this morning but denies headache or palpitations. Patient reports she is eating more and her nausea is okay today and denies vomitting. Patient reports 1 episode of diarrhea this morning. No acute or major events overnight.    Code Status: not discussed    Family communication: n/a  Contact date:  Name of person contacted:  Relationship to patient:  Communication details:  What matters most:    PAST MEDICAL & SURGICAL HISTORY    SOCIAL HISTORY:  Social History:      ALLERGIES:  Allergy Status Unknown    MEDICATIONS:  STANDING MEDICATIONS  heparin   Injectable 5000 Unit(s) SubCutaneous every 8 hours  losartan 50 milliGRAM(s) Oral daily  NIFEdipine XL 90 milliGRAM(s) Oral at bedtime  pantoprazole    Tablet 40 milliGRAM(s) Oral daily    PRN MEDICATIONS  aluminum hydroxide/magnesium hydroxide/simethicone Suspension 30 milliLiter(s) Oral every 4 hours PRN  ondansetron    Tablet 4 milliGRAM(s) Oral three times a day PRN  polyethylene glycol 3350 17 Gram(s) Oral two times a day PRN  senna 2 Tablet(s) Oral at bedtime PRN    VITALS:   T(F): 96.5  HR: 95  BP: 137/65  RR: 18  SpO2: --    PHYSICAL EXAM:  GENERAL:   ( x ) NAD, lying in bed comfortably     (  ) obtunded     (  ) lethargic     (  ) somnolent    HEAD:   ( x ) Atraumatic     (  ) hematoma     (  ) laceration (specify location:       )     NECK:  ( x ) Supple     (  ) neck stiffness     (  ) nuchal rigidity     (  )  no JVD     (  ) JVD present ( -- cm)    HEART:  Rate -->     ( x ) normal rate     (  ) bradycardic     (  ) tachycardic  Rhythm -->     ( x ) regular     (  ) regularly irregular     (  ) irregularly irregular  Murmurs -->     ( x ) normal s1s2     (  ) systolic murmur     (  ) diastolic murmur     (  ) continuous murmur      (  ) S3 present     (  ) S4 present    LUNGS:   ( x )Unlabored respirations     (  ) tachypnea  ( x ) B/L air entry     (  ) decreased breath sounds in:  (location     )    ( x ) no adventitious sound     (  ) crackles     (  ) wheezing      (  ) rhonchi      (specify location:       )  (  ) chest wall tenderness (specify location:       )    ABDOMEN:   ( x ) Soft     (  ) tense   |   ( x ) nondistended     (  ) distended   |   ( x ) +BS     (  ) hypoactive bowel sounds     (  ) hyperactive bowel sounds  ( x ) nontender     (  ) RUQ tenderness     (  ) RLQ tenderness     (  ) LLQ tenderness     (  ) epigastric tenderness     (  ) diffuse tenderness  (  ) Splenomegaly      (  ) Hepatomegaly      (  ) Jaundice     (  ) ecchymosis     EXTREMITIES:  ( x ) Normal     (  ) Rash     (  ) ecchymosis     (  ) varicose veins      (  ) pitting edema     (  ) non-pitting edema   (  ) ulceration     (  ) gangrene:     (location:     )    NERVOUS SYSTEM:    ( x ) A&Ox3     (  ) confused     (  ) lethargic  CN II-XII:     ( x ) Intact     (  ) deficits found     (Specify:     )   Upper extremities:     ( x ) no sensorimotor deficits     (  ) weakness     (  ) loss of proprioception/vibration     (  ) loss of touch/temperature (specify:    )  Lower extremities:     ( x ) no sensorimotor deficits     (  ) weakness     (  ) loss of proprioception/vibration     (  ) loss of touch/temperature (specify:    )    SKIN:   ( x ) No rashes or lesions     (  ) maculopapular rash     (  ) pustules     (  ) vesicles     (  ) ulcer     (  ) ecchymosis     (specify location:     )    AMPAC score: n/a    (  ) Indwelling Ospina Catheter: none    (  ) Central Line: none    LABS:                        14.8   8.93  )-----------( 325      ( 21 Aug 2023 05:23 )             44.8     08-21    140  |  101  |  14  ----------------------------<  108<H>  3.8   |  27  |  1.2    Ca    9.3      21 Aug 2023 05:23        Urinalysis Basic - ( 21 Aug 2023 05:23 )    Color: x / Appearance: x / SG: x / pH: x  Gluc: 108 mg/dL / Ketone: x  / Bili: x / Urobili: x   Blood: x / Protein: x / Nitrite: x   Leuk Esterase: x / RBC: x / WBC x   Sq Epi: x / Non Sq Epi: x / Bacteria: x          RADIOLOGY:    < from: MR Head No Cont (08.21.23 @ 12:31) >  IMPRESSION:  1.  No MRI evidence to suggest acute ischemic change.  2.  Cerebral and corpus callosal atrophy.  3.  T2/FLAIR hyperintensities in the periventricular white matter, subcortical white matter, and brainstem likely representing chronic ischemic change.  < end of copied text >  
Progress Note:  Provider Speciality                            Hospitalist      MIKEL PEDROZA [id number] 82y Female     CHIEF PRESENTING COMPLAINT:  Patient is a 82y old  Female who presents with a chief complaint of lightheadedness (18 Aug 2023 14:07)        SUBJECTIVE:  Patient was seen and examined at bedside. No new complaints described by patient in morning rounds.   No significant overnight events reported.     HISTORY OF PRESENTING ILLNESS:  HPI:  Patient is an 82 year old female with PMH HTN who presents with lightheadedness and decreased po intake for the past 3 weeks.  Patient states that on 7/26 she went to the dentist and she got dentures and the dental office used a paste on the dentures which made her nauseas and now every time she eats she tastes the bad taste of the dentures and cannot eat.  Sometimes she is able to quickly swallow broth but not chew foods.  She states that not being able to eat properly has led to her being lightheaded all the time.  She oftentimes feels like she will pass out but has never passed out.  Patient has hypertension which she self treats with garlic. She also feels constipated from lack of eating real food over the past few weeks.    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 Otherwise no significant stenosis of the vessels of the head and neck. Bilateral thyroid nodules measuring up to 1.4 cm in the right thyroid lobe. May consider nonemergent outpatient thyroid ultrasound for further evaluation.  Ct head non con: No evidence for acute intracranial pathology.   (18 Aug 2023 05:17)        REVIEW OF SYSTEMS:  Patient denies  headache, fever, chills. Denies chest pain, shortness of breath, palpitation. Denies nausea, vomiting, abdominal pain or diarrhoea, Denies dysuria.   At least 10 systems were reviewed in ROS. All systems reviewed  are within normal limits except for the complaints as described in Subjective.    PAST MEDICAL & SURGICAL HISTORY:  PAST MEDICAL & SURGICAL HISTORY:          VITAL SIGNS:  Vital Signs Last 24 Hrs  T(C): 36.7 (26 Aug 2023 04:17), Max: 36.7 (25 Aug 2023 12:51)  T(F): 98 (26 Aug 2023 04:17), Max: 98 (25 Aug 2023 12:51)  HR: 85 (26 Aug 2023 07:37) (81 - 88)  BP: 150/70 (26 Aug 2023 06:57) (140/69 - 186/85)  BP(mean): 112 (26 Aug 2023 04:17) (112 - 112)  RR: 18 (26 Aug 2023 07:37) (18 - 18)  SpO2: 96% (26 Aug 2023 07:37) (96% - 96%)    Parameters below as of 26 Aug 2023 07:37  Patient On (Oxygen Delivery Method): room air                PHYSICAL EXAMINATION:  Not in acute distress  General: No icterus  HEENT:   no JVD.  Heart: S1+S2 audible  Lungs: bilateral  moderate air entry, no wheezing, no crepitations.  Abdomen: Soft, non-tender, non-distended , no  rigidity or guarding.  CNS: Awake alert, CN  grossly intact.  Extremities:  No edema            CONSULTS:  Consultant(s) Notes Reviewed by me.   Care Discussed with Consultants/Other Providers where required.    All the images and labs were reviewed today        MEDICATIONS:  MEDICATIONS  (STANDING):  heparin   Injectable 5000 Unit(s) SubCutaneous every 8 hours  hydrochlorothiazide 12.5 milliGRAM(s) Oral daily  NIFEdipine XL 90 milliGRAM(s) Oral at bedtime  pantoprazole    Tablet 40 milliGRAM(s) Oral two times a day    MEDICATIONS  (PRN):  aluminum hydroxide/magnesium hydroxide/simethicone Suspension 30 milliLiter(s) Oral every 4 hours PRN Dyspepsia  ondansetron    Tablet 4 milliGRAM(s) Oral three times a day PRN Nausea and/or Vomiting  polyethylene glycol 3350 17 Gram(s) Oral two times a day PRN Constipation  senna 2 Tablet(s) Oral at bedtime PRN Constipation              
  KASIAMIKEL  82y Female    INTERVAL HPI/OVERNIGHT EVENTS:    pt known to me from Friday  she c/o nausea, constipation and dizziness (lightheaded sensation)  no vomiting, fever, SOB, chest pain, headache  decreased PO intake  wants to eat food from home (educated the pt that she needs to follow a low sodium diet)    T(F): 97.4 (23 @ 04:59), Max: 98.4 (23 @ 13:28)  HR: 95 (23 @ 04:59) (82 - 100)  BP: 159/73 (23 @ 04:59) (145/68 - 193/84)  RR: 18 (23 @ 04:59) (16 - 18)  SpO2: --    I&O's Summary    20 Aug 2023 07:  -  21 Aug 2023 07:00  --------------------------------------------------------  IN: 667 mL / OUT: 300 mL / NET: 367 mL    21 Aug 2023 07:01  -  21 Aug 2023 13:07  --------------------------------------------------------  IN: 210 mL / OUT: 0 mL / NET: 210 mL        Daily Height in cm: 162.56 (21 Aug 2023 09:11)    Daily Weight in k (21 Aug 2023 04:59)    PHYSICAL EXAM:  GENERAL: NAD  HEAD:  Normocephalic  EYES:  conjunctiva and sclera clear  ENMT: Moist mucous membranes  NECK: Supple  NERVOUS SYSTEM:  Alert, awake, Good concentration, no nystagmus, EOMI, motor equally UE and LE, sensation intact  CHEST/LUNG: CTA b/l  HEART: Regular rate and rhythm  ABDOMEN: Soft, Nontender, Nondistended  EXTREMITIES: No edema  SKIN: warm, dry    Consultant(s) Notes Reviewed:  [x ] YES  [ ] NO  Care Discussed with Consultants/Other Providers [ x] YES  [ ] NO    MEDICATIONS  (STANDING):  heparin   Injectable 5000 Unit(s) SubCutaneous every 8 hours  losartan 50 milliGRAM(s) Oral daily  NIFEdipine XL 60 milliGRAM(s) Oral at bedtime  pantoprazole    Tablet 40 milliGRAM(s) Oral daily  polyethylene glycol 3350 17 Gram(s) Oral two times a day  senna 2 Tablet(s) Oral at bedtime  spironolactone 25 milliGRAM(s) Oral daily    MEDICATIONS  (PRN):  aluminum hydroxide/magnesium hydroxide/simethicone Suspension 30 milliLiter(s) Oral every 4 hours PRN Dyspepsia  ondansetron    Tablet 4 milliGRAM(s) Oral three times a day PRN Nausea and/or Vomiting      Telemetry reviewed by me    LABS:                        14.8   8.93  )-----------( 325      ( 21 Aug 2023 05:23 )             44.8         140  |  101  |  14  ----------------------------<  108<H>  3.8   |  27  |  1.2    Ca    9.3      21 Aug 2023 05:23                RADIOLOGY & ADDITIONAL TESTS:    Imaging or report Personally Reviewed:  [x ] YES  [ ] NO    < from: TTE Echo Complete w/o Contrast w/ Doppler (23 @ 12:15) >  Summary:   1. Normal global left ventricular systolic function.   2. LV Ejection Fraction by Perrin's Method with a biplane EF of 59 %.   3. Normal right ventricular size and function.   4. No hemodynamically significant valvular abnormality.   5. Adequate TR velocity was not obtained to accurately assess RVSP.    < end of copied text >      Case discussed with residents and RN on rounds today    Care discussed with pt        
  MIKEL PEDROZA  82y  Female      Patient is a 82y old  Female who presents with a chief complaint of     INTERVAL HPI/OVERNIGHT EVENTS:  She is still with nausea, no diarrhea, she felt dizzy today, she attributed to her BP dropped fast.   Vital Signs Last 24 Hrs  T(C): 36.6 (20 Aug 2023 04:55), Max: 37.3 (19 Aug 2023 19:59)  T(F): 97.8 (20 Aug 2023 04:55), Max: 99.2 (19 Aug 2023 19:59)  HR: 105 (20 Aug 2023 07:29) (94 - 113)  BP: 150/74 (20 Aug 2023 07:29) (150/74 - 197/88)  BP(mean): --  RR: 18 (20 Aug 2023 04:55) (18 - 18)  SpO2: 98% (20 Aug 2023 00:38) (98% - 98%)    Parameters below as of 20 Aug 2023 00:38  Patient On (Oxygen Delivery Method): room air          08-19-23 @ 07:01  -  08-20-23 @ 07:00  --------------------------------------------------------  IN: 929 mL / OUT: 0 mL / NET: 929 mL    08-20-23 @ 07:01  - 08-20-23 @ 11:59  --------------------------------------------------------  IN: 266 mL / OUT: 0 mL / NET: 266 mL            Consultant(s) Notes Reviewed:  [x ] YES  [ ] NO          MEDICATIONS  (STANDING):  heparin   Injectable 5000 Unit(s) SubCutaneous every 12 hours  losartan 50 milliGRAM(s) Oral daily  NIFEdipine XL 60 milliGRAM(s) Oral at bedtime  pantoprazole    Tablet 40 milliGRAM(s) Oral daily    MEDICATIONS  (PRN):  aluminum hydroxide/magnesium hydroxide/simethicone Suspension 30 milliLiter(s) Oral every 4 hours PRN Dyspepsia  ondansetron    Tablet 4 milliGRAM(s) Oral three times a day PRN Nausea and/or Vomiting      LABS                          14.3   8.66  )-----------( 299      ( 20 Aug 2023 06:03 )             42.9     08-20    140  |  102  |  10  ----------------------------<  117<H>  3.3<L>   |  25  |  1.1    Ca    9.0      20 Aug 2023 06:03        Urinalysis Basic - ( 20 Aug 2023 06:03 )    Color: x / Appearance: x / SG: x / pH: x  Gluc: 117 mg/dL / Ketone: x  / Bili: x / Urobili: x   Blood: x / Protein: x / Nitrite: x   Leuk Esterase: x / RBC: x / WBC x   Sq Epi: x / Non Sq Epi: x / Bacteria: x        Lactate Trend        CAPILLARY BLOOD GLUCOSE            RADIOLOGY & ADDITIONAL TESTS:    Imaging Personally Reviewed:  [ ] YES  [ ] NO    HEALTH ISSUES - PROBLEM Dx:          PHYSICAL EXAM:  GENERAL: NAD, well-developed.  HEAD:  Atraumatic, Normocephalic.  EYES: EOMI, PERRLA, conjunctiva and sclera clear.  NECK: Supple, No JVD.  CHEST/LUNG: Clear to auscultation bilaterally; No wheeze.  HEART: Regular rate and rhythm; S1 S2.   ABDOMEN: Soft, Nontender, Nondistended; Bowel sounds present.  EXTREMITIES:  2+ Peripheral Pulses, No clubbing, cyanosis, or edema.  PSYCH: AAOx3.  NEUROLOGY: non-focal.  SKIN: No rashes or lesions.
  KASIAMIKEL  82y Female    INTERVAL HPI/OVERNIGHT EVENTS:    patient told me that she is feeling better but still has some nausea and wants to be discharged by tomorrow   she told me that she was takingh some BP medication in 2019 but she does not remember the name       Vital Signs Last 24 Hrs  T(C): 36.3 (24 Aug 2023 05:15), Max: 36.4 (23 Aug 2023 13:10)  T(F): 97.4 (24 Aug 2023 05:15), Max: 97.6 (23 Aug 2023 13:10)  HR: 84 (24 Aug 2023 05:15) (84 - 95)  BP: 141/71 (24 Aug 2023 05:15) (141/71 - 160/72)  BP(mean): --  RR: 18 (24 Aug 2023 05:15) (18 - 18)  SpO2: 97% (23 Aug 2023 13:10) (97% - 97%)      I&O's Summary    22 Aug 2023 07:01  -  23 Aug 2023 07:00  --------------------------------------------------------  IN: 450 mL / OUT: 0 mL / NET: 450 mL    PHYSICAL EXAM:  GENERAL: NAD  HEAD:  Normocephalic  EYES:  conjunctiva and sclera clear  ENMT: Moist mucous membranes  NERVOUS SYSTEM:  Alert, awake, Good concentration, motor LE 5/5, sensation intact  CHEST/LUNG: CTA b/l  HEART: Regular rate and rhythm  ABDOMEN: Soft, Nontender, Nondistended; Bowel sounds present  EXTREMITIES:   No edema LE  left LE no erythema, warmth, rash, DP pulse 2+  SKIN: warm, dry    Consultant(s) Notes Reviewed:  [x ] YES  [ ] NO  Care Discussed with Consultants/Other Providers [ x] YES  [ ] NO    MEDICATIONS  (STANDING):  heparin   Injectable 5000 Unit(s) SubCutaneous every 8 hours  losartan 50 milliGRAM(s) Oral daily  NIFEdipine XL 90 milliGRAM(s) Oral at bedtime  pantoprazole    Tablet 40 milliGRAM(s) Oral two times a day    MEDICATIONS  (PRN):  aluminum hydroxide/magnesium hydroxide/simethicone Suspension 30 milliLiter(s) Oral every 4 hours PRN Dyspepsia  ondansetron    Tablet 4 milliGRAM(s) Oral three times a day PRN Nausea and/or Vomiting  polyethylene glycol 3350 17 Gram(s) Oral two times a day PRN Constipation  senna 2 Tablet(s) Oral at bedtime PRN Constipation      LABS:    LABS:    08-24    141  |  101  |  18  ----------------------------<  119<H>  3.8   |  26  |  1.5    Ca    9.2      24 Aug 2023 07:58  Mg     2.4     08-24    TPro  6.8  /  Alb  4.1  /  TBili  0.6  /  DBili  x   /  AST  121<H>  /  ALT  105<H>  /  AlkPhos  57  08-24         07:03  Mg     2.4     08-23              Case discussed with resident, med student today    Care discussed with pt        
  KASIAMIKEL  82y Female    INTERVAL HPI/OVERNIGHT EVENTS:    pt still with some dizziness (described as a lightheaded sensation) but overall improved from admission  no nausea today  vomited x 1  no abdominal pain, Chest pain, SOB  ate some breakfast  had BM today    T(F): 96.5 (08-22-23 @ 05:04), Max: 98.3 (08-21-23 @ 13:50)  HR: 95 (08-22-23 @ 05:04) (92 - 99)  BP: 137/65 (08-22-23 @ 05:04) (137/65 - 189/95)  RR: 18 (08-22-23 @ 05:04) (18 - 18)  SpO2: --    I&O's Summary    21 Aug 2023 07:01  -  22 Aug 2023 07:00  --------------------------------------------------------  IN: 450 mL / OUT: 0 mL / NET: 450 mL    22 Aug 2023 07:01  -  22 Aug 2023 11:51  --------------------------------------------------------  IN: 210 mL / OUT: 0 mL / NET: 210 mL      PHYSICAL EXAM:  GENERAL: NAD  HEAD:  Normocephalic  EYES:  conjunctiva and sclera clear  ENMT: Moist mucous membranes  NECK: Supple  NERVOUS SYSTEM:  Alert, awake, Good concentration  CHEST/LUNG: CTA b/l  HEART: Regular rate and rhythm  ABDOMEN: Soft, Nontender, Nondistended  EXTREMITIES: No edema  SKIN: warm, dry    Consultant(s) Notes Reviewed:  [x ] YES  [ ] NO  Care Discussed with Consultants/Other Providers [ x] YES  [ ] NO    MEDICATIONS  (STANDING):  heparin   Injectable 5000 Unit(s) SubCutaneous every 8 hours  losartan 50 milliGRAM(s) Oral daily  NIFEdipine XL 90 milliGRAM(s) Oral at bedtime  pantoprazole    Tablet 40 milliGRAM(s) Oral daily    MEDICATIONS  (PRN):  aluminum hydroxide/magnesium hydroxide/simethicone Suspension 30 milliLiter(s) Oral every 4 hours PRN Dyspepsia  ondansetron    Tablet 4 milliGRAM(s) Oral three times a day PRN Nausea and/or Vomiting  polyethylene glycol 3350 17 Gram(s) Oral two times a day PRN Constipation  senna 2 Tablet(s) Oral at bedtime PRN Constipation      Telemetry reviewed by me    LABS:                        14.8   8.93  )-----------( 325      ( 21 Aug 2023 05:23 )             44.8     08-21    140  |  101  |  14  ----------------------------<  108<H>  3.8   |  27  |  1.2    Ca    9.3      21 Aug 2023 05:23                RADIOLOGY & ADDITIONAL TESTS:    Imaging or report Personally Reviewed:  [x ] YES  [ ] NO    < from: MR Head No Cont (08.21.23 @ 12:31) >  IMPRESSION:    1.  No MRI evidence to suggest acute ischemic change.    2.  Cerebral and corpus callosal atrophy.    3.  T2/FLAIR hyperintensities in the periventricular white matter,   subcortical white matter, and brainstem likely representing chronic   ischemic change.    < end of copied text >      Case discussed with residents and RN on rounds today    Care discussed with pt      
  PEDROZAMIKEL HUGGINS  82y Female    INTERVAL HPI/OVERNIGHT EVENTS:    pt still with some lightheadedness but better than admission  heartburn sensation  eating some food but does not like the food here (told pt that family can bring low sodium food)  c/o left lower leg heaviness from above ankle to foot that started yesterday  teary at times (lives alone but will stay with her son on discharge)  ambulated with PT    T(F): 97.6 (08-23-23 @ 13:10), Max: 98.2 (08-22-23 @ 13:43)  HR: 95 (08-23-23 @ 13:10) (86 - 95)  BP: 160/72 (08-23-23 @ 13:10) (133/64 - 177/79)  RR: 18 (08-23-23 @ 13:10) (18 - 18)  SpO2: 97% (08-23-23 @ 13:10) (97% - 100%)  I&O's Summary    22 Aug 2023 07:01  -  23 Aug 2023 07:00  --------------------------------------------------------  IN: 450 mL / OUT: 0 mL / NET: 450 mL    PHYSICAL EXAM:  GENERAL: NAD  HEAD:  Normocephalic  EYES:  conjunctiva and sclera clear  ENMT: Moist mucous membranes  NERVOUS SYSTEM:  Alert, awake, Good concentration, motor LE 5/5, sensation intact  CHEST/LUNG: CTA b/l  HEART: Regular rate and rhythm  ABDOMEN: Soft, Nontender, Nondistended; Bowel sounds present  EXTREMITIES:   No edema LE  left LE no erythema, warmth, rash, DP pulse 2+  SKIN: warm, dry    Consultant(s) Notes Reviewed:  [x ] YES  [ ] NO  Care Discussed with Consultants/Other Providers [ x] YES  [ ] NO    MEDICATIONS  (STANDING):  heparin   Injectable 5000 Unit(s) SubCutaneous every 8 hours  losartan 50 milliGRAM(s) Oral daily  NIFEdipine XL 90 milliGRAM(s) Oral at bedtime  pantoprazole    Tablet 40 milliGRAM(s) Oral two times a day    MEDICATIONS  (PRN):  aluminum hydroxide/magnesium hydroxide/simethicone Suspension 30 milliLiter(s) Oral every 4 hours PRN Dyspepsia  ondansetron    Tablet 4 milliGRAM(s) Oral three times a day PRN Nausea and/or Vomiting  polyethylene glycol 3350 17 Gram(s) Oral two times a day PRN Constipation  senna 2 Tablet(s) Oral at bedtime PRN Constipation      LABS:    08-23    142  |  102  |  17  ----------------------------<  109<H>  3.8   |  26  |  1.4    Ca    8.8      23 Aug 2023 07:03  Mg     2.4     08-23              Case discussed with resident, med student today    Care discussed with pt        
24H events:    Patient is a 82y old Female who presents with a chief complaint of   Primary diagnosis of Lightheadedness      Today is hospital day 3d. This morning patient was seen and examined at bedside, resting comfortably in bed.    No acute or major events overnight.    PAST MEDICAL & SURGICAL HISTORY    SOCIAL HISTORY:  Social History:      ALLERGIES:  Allergy Status Unknown    MEDICATIONS:  STANDING MEDICATIONS  heparin   Injectable 5000 Unit(s) SubCutaneous every 12 hours  losartan 50 milliGRAM(s) Oral daily  NIFEdipine XL 60 milliGRAM(s) Oral at bedtime  pantoprazole    Tablet 40 milliGRAM(s) Oral daily  polyethylene glycol 3350 17 Gram(s) Oral two times a day  senna 2 Tablet(s) Oral at bedtime  spironolactone 25 milliGRAM(s) Oral daily    PRN MEDICATIONS  aluminum hydroxide/magnesium hydroxide/simethicone Suspension 30 milliLiter(s) Oral every 4 hours PRN  LORazepam     Tablet 0.5 milliGRAM(s) Oral once PRN  ondansetron    Tablet 4 milliGRAM(s) Oral three times a day PRN    VITALS:   T(F): 97.4  HR: 95  BP: 159/73  RR: 18  SpO2: --    PHYSICAL EXAM:  GENERAL: NAD, lying comfortably in bed  HEAD:  Atraumatic, Normocephalic  EYES: EOMI, PERRLA, conjunctiva and sclera clear  ENMT: No tonsillar erythema, exudates, or enlargement; Moist mucous membranes  NECK: Supple, No JVD, Normal thyroid  HEART: Regular rate and rhythm; No murmurs, rubs, or gallops  RESPIRATORY: CTA B/L, No W/R/R  ABDOMEN: Soft, Nontender, Nondistended; Bowel sounds present  NEUROLOGY: A&Ox3, nonfocal, moving all extremities. CN II - XII intact  EXTREMITIES:  2+ Peripheral Pulses, No clubbing, cyanosis, or edema    LABS:                        14.8   8.93  )-----------( 325      ( 21 Aug 2023 05:23 )             44.8     08-21    140  |  101  |  14  ----------------------------<  108<H>  3.8   |  27  |  1.2    Ca    9.3      21 Aug 2023 05:23        Urinalysis Basic - ( 21 Aug 2023 05:23 )    Color: x / Appearance: x / SG: x / pH: x  Gluc: 108 mg/dL / Ketone: x  / Bili: x / Urobili: x   Blood: x / Protein: x / Nitrite: x   Leuk Esterase: x / RBC: x / WBC x   Sq Epi: x / Non Sq Epi: x / Bacteria: x                        
Progress Note:  Provider Speciality                            Hospitalist      MIKEL PEDROZA [id number] 82y Female     CHIEF PRESENTING COMPLAINT:  Patient is a 82y old  Female who presents with a chief complaint of lightheadedness (18 Aug 2023 14:07)        SUBJECTIVE:  Patient was seen and examined at bedside. No new complaints described by patient in morning rounds.   No significant overnight events reported.     HISTORY OF PRESENTING ILLNESS:  HPI:  Patient is an 82 year old female with PMH HTN who presents with lightheadedness and decreased po intake for the past 3 weeks.  Patient states that on 7/26 she went to the dentist and she got dentures and the dental office used a paste on the dentures which made her nauseas and now every time she eats she tastes the bad taste of the dentures and cannot eat.  Sometimes she is able to quickly swallow broth but not chew foods.  She states that not being able to eat properly has led to her being lightheaded all the time.  She oftentimes feels like she will pass out but has never passed out.  Patient has hypertension which she self treats with garlic. She also feels constipated from lack of eating real food over the past few weeks.    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 Otherwise no significant stenosis of the vessels of the head and neck. Bilateral thyroid nodules measuring up to 1.4 cm in the right thyroid lobe. May consider nonemergent outpatient thyroid ultrasound for further evaluation.  Ct head non con: No evidence for acute intracranial pathology.   (18 Aug 2023 05:17)        REVIEW OF SYSTEMS:  Patient denies  headache, fever, chills. Denies chest pain, shortness of breath, palpitation. Denies nausea, vomiting, abdominal pain or diarrhoea, Denies dysuria.   At least 10 systems were reviewed in ROS. All systems reviewed  are within normal limits except for the complaints as described in Subjective.    PAST MEDICAL & SURGICAL HISTORY:  PAST MEDICAL & SURGICAL HISTORY:          VITAL SIGNS:  Vital Signs Last 24 Hrs  T(C): 36.7 (25 Aug 2023 12:51), Max: 37.1 (24 Aug 2023 14:24)  T(F): 98 (25 Aug 2023 12:51), Max: 98.7 (24 Aug 2023 14:24)  HR: 88 (25 Aug 2023 12:51) (83 - 106)  BP: 140/69 (25 Aug 2023 12:51) (112/72 - 161/74)  BP(mean): --  RR: 18 (25 Aug 2023 12:51) (18 - 18)  SpO2: --              PHYSICAL EXAMINATION:  Not in acute distress  General: No icterus  HEENT:   no JVD.  Heart: S1+S2 audible  Lungs: bilateral  moderate air entry, no wheezing, no crepitations.  Abdomen: Soft, non-tender, non-distended , no  rigidity or guarding.  CNS: Awake alert, CN  grossly intact.  Extremities:  No edema            CONSULTS:  Consultant(s) Notes Reviewed by me.   Care Discussed with Consultants/Other Providers where required.    All the images and labs were reviewed today        MEDICATIONS:  MEDICATIONS  (STANDING):  heparin   Injectable 5000 Unit(s) SubCutaneous every 8 hours  hydrochlorothiazide 12.5 milliGRAM(s) Oral daily  NIFEdipine XL 90 milliGRAM(s) Oral at bedtime  pantoprazole    Tablet 40 milliGRAM(s) Oral two times a day    MEDICATIONS  (PRN):  aluminum hydroxide/magnesium hydroxide/simethicone Suspension 30 milliLiter(s) Oral every 4 hours PRN Dyspepsia  ondansetron    Tablet 4 milliGRAM(s) Oral three times a day PRN Nausea and/or Vomiting  polyethylene glycol 3350 17 Gram(s) Oral two times a day PRN Constipation  senna 2 Tablet(s) Oral at bedtime PRN Constipation

## 2023-08-26 NOTE — PROGRESS NOTE ADULT - ASSESSMENT
83 y/o woman with PMH of HTN on garlic presented with lightheadedness and decreased po intake for the past 3 weeks.      Possible metabolic encephalopathy due to hypertensive urgency  HTH urgency  CHRIS with CKD stage 3  Mild transaminitis  pre-diabetes      Stroke ruled out  Negative orthostatics  CHRIS with Mild transaminitis stable but not improving. US ruq showing Diffuse hepatic steatosis.  IVF discontinued   Thyroid panel unremarkable  No dyslipidemia   pre-diabetes- consistent carb diet  BP- added low dose HCTZ along with procardia 90  [facility] appt: Sept 7 at 2:15PM with Dr. Quijano  Handoff: Stable for discharge with [facility] today

## 2023-08-30 DIAGNOSIS — I16.0 HYPERTENSIVE URGENCY: ICD-10-CM

## 2023-08-30 DIAGNOSIS — R12 HEARTBURN: ICD-10-CM

## 2023-08-30 DIAGNOSIS — K82.4 CHOLESTEROLOSIS OF GALLBLADDER: ICD-10-CM

## 2023-08-30 DIAGNOSIS — N17.9 ACUTE KIDNEY FAILURE, UNSPECIFIED: ICD-10-CM

## 2023-08-30 DIAGNOSIS — K59.00 CONSTIPATION, UNSPECIFIED: ICD-10-CM

## 2023-08-30 DIAGNOSIS — E04.2 NONTOXIC MULTINODULAR GOITER: ICD-10-CM

## 2023-08-30 DIAGNOSIS — I12.9 HYPERTENSIVE CHRONIC KIDNEY DISEASE WITH STAGE 1 THROUGH STAGE 4 CHRONIC KIDNEY DISEASE, OR UNSPECIFIED CHRONIC KIDNEY DISEASE: ICD-10-CM

## 2023-08-30 DIAGNOSIS — N18.30 CHRONIC KIDNEY DISEASE, STAGE 3 UNSPECIFIED: ICD-10-CM

## 2023-08-30 DIAGNOSIS — K76.0 FATTY (CHANGE OF) LIVER, NOT ELSEWHERE CLASSIFIED: ICD-10-CM

## 2023-08-30 DIAGNOSIS — R73.03 PREDIABETES: ICD-10-CM

## 2023-08-30 DIAGNOSIS — I66.22 OCCLUSION AND STENOSIS OF LEFT POSTERIOR CEREBRAL ARTERY: ICD-10-CM

## 2023-08-30 DIAGNOSIS — R55 SYNCOPE AND COLLAPSE: ICD-10-CM

## 2023-08-31 PROBLEM — Z00.00 ENCOUNTER FOR PREVENTIVE HEALTH EXAMINATION: Status: ACTIVE | Noted: 2023-08-31

## 2023-09-07 ENCOUNTER — APPOINTMENT (OUTPATIENT)
Dept: GERIATRICS | Facility: CLINIC | Age: 83
End: 2023-09-07

## 2023-09-07 ENCOUNTER — OUTPATIENT (OUTPATIENT)
Dept: OUTPATIENT SERVICES | Facility: HOSPITAL | Age: 83
LOS: 1 days | End: 2023-09-07

## 2023-09-07 DIAGNOSIS — Z00.00 ENCOUNTER FOR GENERAL ADULT MEDICAL EXAMINATION WITHOUT ABNORMAL FINDINGS: ICD-10-CM

## 2023-09-07 DIAGNOSIS — R74.01 ELEVATION OF LEVELS OF LIVER TRANSAMINASE LEVELS: ICD-10-CM

## 2023-09-07 DIAGNOSIS — Z86.39 PERSONAL HISTORY OF OTHER ENDOCRINE, NUTRITIONAL AND METABOLIC DISEASE: ICD-10-CM

## 2023-09-22 NOTE — ED ADULT NURSE NOTE - CAS EDP DISCH DISPOSITION ADMI
Problem: Stroke: Ischemic (Transient/Permanent)  Goal: Neurological status is maintained/restored to status at baseline  Description: Monitor neurological and mental status including symptoms of increasing intracranial pressure (headache, nausea/vomiting, or change in behavior). Hypertension (greater than 180 systolic) may also indicate a change in status related to stroke.  Outcome: Outcome Met, Complete Goal  Goal: Normal temperature is maintained  Outcome: Outcome Met, Complete Goal  Goal: Elimination status is maintained/returned to baseline  Description: Remove indwelling urinary catheter as soon as possible or collaborate with provider for order/reason for continued use.   Outcome: Outcome Met, Complete Goal  Goal: #Depressive s/s (self-reported/observed) are recognized and monitored  Outcome: Outcome Met, Complete Goal  Goal: Personal stroke risk factors are identified with initial plan for risk reduction  Description: Stroke risk reduction involves taking medication, changing diet, increasing physical exercise, smoking cessation, or alcohol/drug use reduction/cessation based on identified risks.  Outcome: Outcome Met, Complete Goal  Goal: Verbalizes understanding of condition and treatment plan  Description: Document on Patient Education Activity  Outcome: Outcome Met, Complete Goal     Problem: VTE, Risk for  Goal: # No s/s of VTE  Outcome: Outcome Met, Complete Goal  Goal: # Verbalizes understanding of VTE risk factors and prevention  Description: Document education using the patient education activity.   Outcome: Outcome Met, Complete Goal  Goal: Demonstrates ability to administer injectable anticoagulants if ordered for d/c  Description: Document education using the patient education activity.  Outcome: Outcome Met, Complete Goal      3c/Telemetry

## 2024-06-01 NOTE — DISCHARGE NOTE PROVIDER - DISCHARGE SERVICE FOR PATIENT
on the discharge service for the patient. I have reviewed and made amendments to the documentation where necessary. Abdomen soft, non-tender, no guarding.

## 2024-12-31 NOTE — SWALLOW BEDSIDE ASSESSMENT ADULT - NS SPL SWALLOW CLINIC TRIAL FT
Pt presents w/ no clinical overt s/s of aspiration/penetration while consuming soft & bite sized & thin liquids. Pt states this is easier for her to eat then solids.
(4) rarely moist